# Patient Record
Sex: FEMALE | Race: WHITE | Employment: OTHER | URBAN - NONMETROPOLITAN AREA
[De-identification: names, ages, dates, MRNs, and addresses within clinical notes are randomized per-mention and may not be internally consistent; named-entity substitution may affect disease eponyms.]

---

## 2022-09-23 ENCOUNTER — HOSPITAL ENCOUNTER (EMERGENCY)
Age: 87
Discharge: HOME | End: 2022-09-23
Attending: EMERGENCY MEDICINE
Payer: MEDICARE

## 2022-09-23 ENCOUNTER — APPOINTMENT (OUTPATIENT)
Dept: GENERAL RADIOLOGY | Age: 87
End: 2022-09-23
Payer: MEDICARE

## 2022-09-23 VITALS
DIASTOLIC BLOOD PRESSURE: 86 MMHG | TEMPERATURE: 98.4 F | SYSTOLIC BLOOD PRESSURE: 156 MMHG | OXYGEN SATURATION: 96 % | RESPIRATION RATE: 18 BRPM | HEART RATE: 67 BPM | WEIGHT: 100 LBS

## 2022-09-23 DIAGNOSIS — R55 NEAR SYNCOPE: Primary | ICD-10-CM

## 2022-09-23 LAB
ALBUMIN SERPL-MCNC: 3.8 G/DL (ref 3.5–5.2)
ALP BLD-CCNC: 74 U/L (ref 35–104)
ALT SERPL-CCNC: 36 U/L (ref 5–33)
ANION GAP SERPL CALCULATED.3IONS-SCNC: 12 MMOL/L (ref 7–19)
AST SERPL-CCNC: 47 U/L (ref 5–32)
BASOPHILS ABSOLUTE: 0 K/UL (ref 0–0.2)
BASOPHILS RELATIVE PERCENT: 0.7 % (ref 0–1)
BILIRUB SERPL-MCNC: 0.6 MG/DL (ref 0.2–1.2)
BILIRUBIN URINE: NEGATIVE
BLOOD, URINE: NEGATIVE
BUN BLDV-MCNC: 22 MG/DL (ref 8–23)
CALCIUM SERPL-MCNC: 9.1 MG/DL (ref 8.8–10.2)
CHLORIDE BLD-SCNC: 105 MMOL/L (ref 98–111)
CLARITY: CLEAR
CO2: 26 MMOL/L (ref 22–29)
COLOR: YELLOW
CREAT SERPL-MCNC: 0.6 MG/DL (ref 0.5–0.9)
EOSINOPHILS ABSOLUTE: 0.1 K/UL (ref 0–0.6)
EOSINOPHILS RELATIVE PERCENT: 2.4 % (ref 0–5)
GFR AFRICAN AMERICAN: >59
GFR NON-AFRICAN AMERICAN: >60
GLUCOSE BLD-MCNC: 94 MG/DL
GLUCOSE BLD-MCNC: 94 MG/DL (ref 74–109)
GLUCOSE URINE: NEGATIVE MG/DL
HCT VFR BLD CALC: 42.5 % (ref 37–47)
HEMOGLOBIN: 13.3 G/DL (ref 12–16)
IMMATURE GRANULOCYTES #: 0 K/UL
KETONES, URINE: NEGATIVE MG/DL
LEUKOCYTE ESTERASE, URINE: NEGATIVE
LYMPHOCYTES ABSOLUTE: 1.1 K/UL (ref 1.1–4.5)
LYMPHOCYTES RELATIVE PERCENT: 24.5 % (ref 20–40)
MCH RBC QN AUTO: 29.9 PG (ref 27–31)
MCHC RBC AUTO-ENTMCNC: 31.3 G/DL (ref 33–37)
MCV RBC AUTO: 95.5 FL (ref 81–99)
MONOCYTES ABSOLUTE: 0.6 K/UL (ref 0–0.9)
MONOCYTES RELATIVE PERCENT: 14.3 % (ref 0–10)
NEUTROPHILS ABSOLUTE: 2.6 K/UL (ref 1.5–7.5)
NEUTROPHILS RELATIVE PERCENT: 57.9 % (ref 50–65)
NITRITE, URINE: NEGATIVE
PDW BLD-RTO: 13.2 % (ref 11.5–14.5)
PH UA: 7.5 (ref 5–8)
PLATELET # BLD: 156 K/UL (ref 130–400)
PMV BLD AUTO: 10.4 FL (ref 9.4–12.3)
POTASSIUM SERPL-SCNC: 3.9 MMOL/L (ref 3.5–5)
PROTEIN UA: NEGATIVE MG/DL
RBC # BLD: 4.45 M/UL (ref 4.2–5.4)
SARS-COV-2, NAAT: NOT DETECTED
SODIUM BLD-SCNC: 143 MMOL/L (ref 136–145)
SPECIFIC GRAVITY UA: 1.01 (ref 1–1.03)
TOTAL PROTEIN: 6.2 G/DL (ref 6.6–8.7)
TROPONIN: <0.01 NG/ML (ref 0–0.03)
UROBILINOGEN, URINE: 0.2 E.U./DL
WBC # BLD: 4.5 K/UL (ref 4.8–10.8)

## 2022-09-23 PROCEDURE — 93005 ELECTROCARDIOGRAM TRACING: CPT | Performed by: EMERGENCY MEDICINE

## 2022-09-23 PROCEDURE — 2580000003 HC RX 258: Performed by: EMERGENCY MEDICINE

## 2022-09-23 PROCEDURE — 71045 X-RAY EXAM CHEST 1 VIEW: CPT

## 2022-09-23 PROCEDURE — 71045 X-RAY EXAM CHEST 1 VIEW: CPT | Performed by: RADIOLOGY

## 2022-09-23 PROCEDURE — 36415 COLL VENOUS BLD VENIPUNCTURE: CPT

## 2022-09-23 PROCEDURE — 81003 URINALYSIS AUTO W/O SCOPE: CPT

## 2022-09-23 PROCEDURE — 99285 EMERGENCY DEPT VISIT HI MDM: CPT

## 2022-09-23 PROCEDURE — 85025 COMPLETE CBC W/AUTO DIFF WBC: CPT

## 2022-09-23 PROCEDURE — 84484 ASSAY OF TROPONIN QUANT: CPT

## 2022-09-23 PROCEDURE — 87635 SARS-COV-2 COVID-19 AMP PRB: CPT

## 2022-09-23 PROCEDURE — 80053 COMPREHEN METABOLIC PANEL: CPT

## 2022-09-23 RX ORDER — SODIUM CHLORIDE, SODIUM LACTATE, POTASSIUM CHLORIDE, AND CALCIUM CHLORIDE .6; .31; .03; .02 G/100ML; G/100ML; G/100ML; G/100ML
500 INJECTION, SOLUTION INTRAVENOUS ONCE
Status: COMPLETED | OUTPATIENT
Start: 2022-09-23 | End: 2022-09-23

## 2022-09-23 RX ADMIN — SODIUM CHLORIDE, POTASSIUM CHLORIDE, SODIUM LACTATE AND CALCIUM CHLORIDE 500 ML: 600; 310; 30; 20 INJECTION, SOLUTION INTRAVENOUS at 21:10

## 2022-09-23 ASSESSMENT — ENCOUNTER SYMPTOMS
COUGH: 0
RHINORRHEA: 0
ABDOMINAL PAIN: 0
EYE PAIN: 0
VOICE CHANGE: 0
SHORTNESS OF BREATH: 0
EYE REDNESS: 0
DIARRHEA: 0
VOMITING: 0

## 2022-09-23 ASSESSMENT — PAIN SCALES - GENERAL: PAINLEVEL_OUTOF10: 4

## 2022-09-23 ASSESSMENT — PAIN - FUNCTIONAL ASSESSMENT: PAIN_FUNCTIONAL_ASSESSMENT: 0-10

## 2022-09-24 NOTE — ED NOTES
Pt states that she is having left sided chest pain. Pt states that pain started 30 minutes ago. Pt states that the pain is a heaviness and does not radiate.  EKG done and given to MD. Nelly Onofre RN  09/23/22 2009

## 2022-09-24 NOTE — ED NOTES
Patient placed on cardiac monitor, continuous pulse oximeter, and NIBP monitor. Monitor alarms on.          Norman Sanchez RN  09/23/22 2454

## 2022-09-26 LAB
EKG P AXIS: 59 DEGREES
EKG P AXIS: 61 DEGREES
EKG P-R INTERVAL: 154 MS
EKG P-R INTERVAL: 155 MS
EKG Q-T INTERVAL: 398 MS
EKG Q-T INTERVAL: 403 MS
EKG QRS DURATION: 90 MS
EKG QRS DURATION: 91 MS
EKG QTC CALCULATION (BAZETT): 408 MS
EKG QTC CALCULATION (BAZETT): 413 MS
EKG T AXIS: 49 DEGREES
EKG T AXIS: 59 DEGREES

## 2022-09-26 PROCEDURE — 93010 ELECTROCARDIOGRAM REPORT: CPT | Performed by: INTERNAL MEDICINE

## 2022-11-15 RX ORDER — METOPROLOL SUCCINATE 25 MG/1
25 TABLET, EXTENDED RELEASE ORAL DAILY
COMMUNITY
Start: 2015-08-14

## 2022-11-15 RX ORDER — LISINOPRIL 40 MG/1
40 TABLET ORAL DAILY
COMMUNITY

## 2022-11-15 RX ORDER — LEVOTHYROXINE SODIUM 0.05 MG/1
50 TABLET ORAL DAILY
COMMUNITY
Start: 2014-02-25

## 2022-11-21 ENCOUNTER — TELEPHONE (OUTPATIENT)
Dept: CARDIOLOGY CLINIC | Age: 87
End: 2022-11-21

## 2022-11-22 ENCOUNTER — OFFICE VISIT (OUTPATIENT)
Dept: CARDIOLOGY CLINIC | Age: 87
End: 2022-11-22
Payer: MEDICARE

## 2022-11-22 VITALS
SYSTOLIC BLOOD PRESSURE: 126 MMHG | OXYGEN SATURATION: 97 % | BODY MASS INDEX: 18.58 KG/M2 | WEIGHT: 101 LBS | HEART RATE: 44 BPM | DIASTOLIC BLOOD PRESSURE: 68 MMHG | HEIGHT: 62 IN

## 2022-11-22 DIAGNOSIS — Z95.0 PACEMAKER: ICD-10-CM

## 2022-11-22 DIAGNOSIS — R00.2 PALPITATION: Primary | ICD-10-CM

## 2022-11-22 PROCEDURE — 99205 OFFICE O/P NEW HI 60 MIN: CPT | Performed by: INTERNAL MEDICINE

## 2022-11-22 PROCEDURE — 1123F ACP DISCUSS/DSCN MKR DOCD: CPT | Performed by: INTERNAL MEDICINE

## 2022-11-22 PROCEDURE — G8427 DOCREV CUR MEDS BY ELIG CLIN: HCPCS | Performed by: INTERNAL MEDICINE

## 2022-11-22 PROCEDURE — G8419 CALC BMI OUT NRM PARAM NOF/U: HCPCS | Performed by: INTERNAL MEDICINE

## 2022-11-22 PROCEDURE — G8484 FLU IMMUNIZE NO ADMIN: HCPCS | Performed by: INTERNAL MEDICINE

## 2022-11-22 PROCEDURE — 93280 PM DEVICE PROGR EVAL DUAL: CPT | Performed by: INTERNAL MEDICINE

## 2022-11-22 PROCEDURE — 1090F PRES/ABSN URINE INCON ASSESS: CPT | Performed by: INTERNAL MEDICINE

## 2022-11-22 PROCEDURE — 4004F PT TOBACCO SCREEN RCVD TLK: CPT | Performed by: INTERNAL MEDICINE

## 2022-11-22 RX ORDER — ISOSORBIDE MONONITRATE 30 MG/1
30 TABLET, EXTENDED RELEASE ORAL DAILY
COMMUNITY
End: 2022-11-22 | Stop reason: SDUPTHER

## 2022-11-22 RX ORDER — NITROGLYCERIN 0.4 MG/1
0.4 TABLET SUBLINGUAL EVERY 5 MIN PRN
Qty: 25 TABLET | Refills: 5 | Status: SHIPPED | OUTPATIENT
Start: 2022-11-22

## 2022-11-22 RX ORDER — ISOSORBIDE MONONITRATE 30 MG/1
30 TABLET, EXTENDED RELEASE ORAL DAILY
Qty: 90 TABLET | Refills: 5 | Status: SHIPPED | OUTPATIENT
Start: 2022-11-22

## 2022-11-22 RX ORDER — MIRTAZAPINE 7.5 MG/1
7.5 TABLET, FILM COATED ORAL NIGHTLY
COMMUNITY

## 2022-11-22 RX ORDER — NITROGLYCERIN 0.4 MG/1
0.4 TABLET SUBLINGUAL EVERY 5 MIN PRN
COMMUNITY
End: 2022-11-22 | Stop reason: SDUPTHER

## 2022-11-22 NOTE — PROGRESS NOTES
Thi Vargas is a 80 y.o. female presents with atrial fibrillation, pacemaker, and chronic stable angina. Overall she has been doing well. She does have a history of neurocardiogenic syncop and had an episode of that last month. She has a long vagal history. Review of Systems   Constitutional: Negative for fever, chills, diaphoresis, activity change, appetite change, fatigue and unexpected weight change. Eyes: Negative for photophobia, pain, redness and visual disturbance. Respiratory: Negative for apnea, cough, chest tightness, shortness of breath, wheezing and stridor. Cardiovascular: Negative for chest pain, palpitations and leg swelling. Gastrointestinal: Negative for abdominal distention. Genitourinary: Negative for dysuria, urgency and frequency. Musculoskeletal: Negative for myalgias, arthralgias and gait problem. Skin: Negative for color change, pallor, rash and wound. Neurological: Negative for dizziness, tremors, speech difficulty, weakness and numbness. Hematological: Does not bruise/bleed easily. Psychiatric/Behavioral: Negative. Past Medical History:   Diagnosis Date    Atrial fibrillation [facility])     Cardiac pacemaker     Hypertension     Hypothyroidism        No past surgical history on file. No family history on file. Social History     Socioeconomic History    Marital status:       Spouse name: Not on file    Number of children: Not on file    Years of education: Not on file    Highest education level: Not on file   Occupational History    Not on file   Tobacco Use    Smoking status: Not on file    Smokeless tobacco: Not on file   Substance and Sexual Activity    Alcohol use: Not on file    Drug use: Not on file    Sexual activity: Not on file   Other Topics Concern    Not on file   Social History Narrative    Not on file     Social Determinants of Health     Financial Resource Strain: Not on file   Food Insecurity: Not on file   Transportation Needs: Not on file   Physical Activity: Not on file   Stress: Not on file   Social Connections: Not on file   Intimate Partner Violence: Not on file   Housing Stability: Not on file       Allergies   Allergen Reactions    Latex Hives    Balsam [place]-Castor Oil Hives    Hydrocodone Other (See Comments)    Morphine Other (See Comments)    Other Other (See Comments)    Sulfa Antibiotics          Current Outpatient Medications:     mirtazapine (REMERON) 7.5 MG tablet, Take 7.5 mg by mouth nightly, Disp: , Rfl:     nitroGLYCERIN (NITROSTAT) 0.4 MG SL tablet, Place 1 tablet under the tongue every 5 minutes as needed for Chest pain up to max of 3 total doses. If no relief after 1 dose, call 911., Disp: 25 tablet, Rfl: 5    isosorbide mononitrate (IMDUR) 30 MG extended release tablet, Take 1 tablet by mouth daily, Disp: 90 tablet, Rfl: 5    vitamin D (CHOLECALCIFEROL) 25 MCG (1000 UT) TABS tablet, Take 1,000 Units by mouth daily, Disp: , Rfl:     levothyroxine (SYNTHROID) 50 MCG tablet, Take 50 mcg by mouth daily, Disp: , Rfl:     metoprolol succinate (TOPROL XL) 25 MG extended release tablet, Take 25 mg by mouth daily, Disp: , Rfl:     UNABLE TO FIND, in the morning and at bedtime Vitc 250mg-mg-E 90mg-zinc 40mg-copper 1mg- lutein-zeaxan chew tablet, Disp: , Rfl:     apixaban (ELIQUIS) 2.5 MG TABS tablet, Take 2.5 mg by mouth 2 times daily, Disp: , Rfl:     lisinopril (PRINIVIL;ZESTRIL) 40 MG tablet, Take 40 mg by mouth daily, Disp: , Rfl:     PE:  Vitals:    11/22/22 1354   BP: 126/68   Pulse: (!) 44   SpO2: 97%   Weight: 101 lb (45.8 kg)   Height: 5' 2\" (1.575 m)       Estimated body mass index is 18.47 kg/m² as calculated from the following:    Height as of this encounter: 5' 2\" (1.575 m). Weight as of this encounter: 101 lb (45.8 kg). Constitutional: She is oriented to person, place, and time. She appears well-developed and well-nourished in no acute distress. Neck:  Neck supple without JVD present.  No trachea deviation present. No thyromegaly present. Eyes:Conjunctivae and EOM are normal. Pupils equal and reactive to light. ENT:Hearing appears normal.conjunctiva and lids are normal, ears and nose appear normal.  Cardiovascular: Normal rate, S1-S2 regular rhythm, normal heart sounds. 2 out of 6 holosystolic murmur ascultated. No gallop and no friction rub. No carotid bruits. No peripheral edema. Pulmonary/Chest:  Lungs clear to auscultation bilaterally without evidence of respiratory distress. She without wheezes. She without rales or ronchi. Musculoskeletal: Normal range of motion. Gait is normal no assitive device. Head is normocephalic and atraumatic. Skin: Skin is warm and dry without rash or pallor. Psychiatric:She is alert and oriented to person, place, and time. She has a normal mood and affect. Her behavior is normal. Thought content normal.       Lab Results   Component Value Date/Time    CREATININE 0.6 09/23/2022 06:30 PM    HGB 13.3 09/23/2022 06:30 PM       Assessment, Recommendations, & Plan:  80 y.o. female with atrial fibrillation, pacemaker therapy, and coronary disease. Her device was interrogated and is functioning normally. She wants to stay on Eliquis for a while but may have to go to Coumadin for cost reasons in the next year. Disposition - RTC in 6 months or sooner if needed      Please do not hesitate to contact me for any questions or concerns. Dr. Aracelis Saavedra.  [place]  Electrophysiology and Cardiology  [facility] [facility] [place], Cardiology  [phone]

## 2022-11-22 NOTE — PROGRESS NOTES
Pacemaker interrogated  Presenting rhythm:  AS/VS, AP 26%,  <1%  Battey voltage 8.3 - 11.0 years  Lead status:  Lead impedance within range and stable  Sensing:  P waves 3.1 mV,  R waves 10.4 mV  Thresholds:  Atrial 0.625V @ 0.4ms, ventricular 0.750 V@ 0.4ms  Observations:  <1% AT/AF burden  Reprogramming for sensitivity and threshold testing

## 2022-11-29 ENCOUNTER — TELEPHONE (OUTPATIENT)
Dept: CARDIOLOGY CLINIC | Age: 87
End: 2022-11-29

## 2022-11-29 ENCOUNTER — HOSPITAL ENCOUNTER (OUTPATIENT)
Dept: NON INVASIVE DIAGNOSTICS | Age: 87
Discharge: HOME | End: 2022-11-29
Payer: MEDICARE

## 2022-11-29 DIAGNOSIS — R00.2 PALPITATION: ICD-10-CM

## 2022-11-29 PROCEDURE — 93306 TTE W/DOPPLER COMPLETE: CPT

## 2022-11-30 ENCOUNTER — TELEPHONE (OUTPATIENT)
Dept: CARDIOLOGY CLINIC | Age: 87
End: 2022-11-30

## 2022-11-30 NOTE — TELEPHONE ENCOUNTER
----- Message from Willis Santoro MD sent at 11/30/2022  8:14 AM CST -----  Let her know echo results normal thx

## 2022-12-01 NOTE — TELEPHONE ENCOUNTER
TYRELLM for Pt to call our office to review results. Also, called Pt's Dayan Wolff, to call our office to review.

## 2022-12-16 ENCOUNTER — HOSPITAL ENCOUNTER (INPATIENT)
Age: 87
LOS: 10 days | Discharge: NURSING, REHAB OR LONG-TERM CARE | DRG: 535 | End: 2022-12-26
Attending: EMERGENCY MEDICINE | Admitting: STUDENT IN AN ORGANIZED HEALTH CARE EDUCATION/TRAINING PROGRAM
Payer: MEDICARE

## 2022-12-16 ENCOUNTER — APPOINTMENT (OUTPATIENT)
Dept: GENERAL RADIOLOGY | Age: 87
DRG: 535 | End: 2022-12-16
Payer: MEDICARE

## 2022-12-16 ENCOUNTER — APPOINTMENT (OUTPATIENT)
Dept: CT IMAGING | Age: 87
DRG: 535 | End: 2022-12-16
Payer: MEDICARE

## 2022-12-16 DIAGNOSIS — S32.89XA CLOSED FRACTURE OF OTHER PARTS OF PELVIS, INITIAL ENCOUNTER (HCC): Primary | ICD-10-CM

## 2022-12-16 PROBLEM — R55 SYNCOPE AND COLLAPSE: Status: ACTIVE | Noted: 2022-12-16
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
## 2022-12-16 LAB
ALBUMIN SERPL-MCNC: 3.9 G/DL (ref 3.5–5.2)
ALLENS TEST: ABNORMAL
ALP BLD-CCNC: 75 U/L (ref 35–104)
ALT SERPL-CCNC: 28 U/L (ref 5–33)
ANION GAP SERPL CALCULATED.3IONS-SCNC: 9 MMOL/L (ref 7–19)
APTT: 36.6 SEC (ref 26–36.2)
AST SERPL-CCNC: 36 U/L (ref 5–32)
BASE EXCESS ARTERIAL: 6.5 MMOL/L (ref -2–2)
BASOPHILS ABSOLUTE: 0.1 K/UL (ref 0–0.2)
BASOPHILS RELATIVE PERCENT: 0.4 % (ref 0–1)
BILIRUB SERPL-MCNC: 0.9 MG/DL (ref 0.2–1.2)
BUN BLDV-MCNC: 21 MG/DL (ref 8–23)
CALCIUM SERPL-MCNC: 9.4 MG/DL (ref 8.8–10.2)
CARBOXYHEMOGLOBIN ARTERIAL: 2.1 % (ref 0–5)
CHLORIDE BLD-SCNC: 105 MMOL/L (ref 98–111)
CO2: 31 MMOL/L (ref 22–29)
CREAT SERPL-MCNC: 0.8 MG/DL (ref 0.5–0.9)
EOSINOPHILS ABSOLUTE: 0 K/UL (ref 0–0.6)
EOSINOPHILS RELATIVE PERCENT: 0.2 % (ref 0–5)
GFR SERPL CREATININE-BSD FRML MDRD: >60 ML/MIN/{1.73_M2}
GLUCOSE BLD-MCNC: 118 MG/DL (ref 74–109)
GLUCOSE BLD-MCNC: 84 MG/DL (ref 70–99)
HCO3 ARTERIAL: 31.9 MMOL/L (ref 22–26)
HCT VFR BLD CALC: 44.5 % (ref 37–47)
HEMOGLOBIN, ART, EXTENDED: 12.4 G/DL (ref 12–16)
HEMOGLOBIN: 14 G/DL (ref 12–16)
IMMATURE GRANULOCYTES #: 0.1 K/UL
INR BLD: 1.23 (ref 0.88–1.18)
LYMPHOCYTES ABSOLUTE: 0.9 K/UL (ref 1.1–4.5)
LYMPHOCYTES RELATIVE PERCENT: 7 % (ref 20–40)
MCH RBC QN AUTO: 30.7 PG (ref 27–31)
MCHC RBC AUTO-ENTMCNC: 31.5 G/DL (ref 33–37)
MCV RBC AUTO: 97.6 FL (ref 81–99)
METHEMOGLOBIN ARTERIAL: 1.3 %
MONOCYTES ABSOLUTE: 0.6 K/UL (ref 0–0.9)
MONOCYTES RELATIVE PERCENT: 4.9 % (ref 0–10)
NEUTROPHILS ABSOLUTE: 10.4 K/UL (ref 1.5–7.5)
NEUTROPHILS RELATIVE PERCENT: 86.3 % (ref 50–65)
O2 CONTENT ARTERIAL: 16.8 ML/DL
O2 DELIVERY DEVICE: ABNORMAL
O2 SAT, ARTERIAL: 95.6 %
O2 THERAPY: ABNORMAL
OXYGEN FLOW: 3
PCO2 ARTERIAL: 48 MMHG (ref 35–45)
PDW BLD-RTO: 13.4 % (ref 11.5–14.5)
PERFORMED ON: NORMAL
PH ARTERIAL: 7.43 (ref 7.35–7.45)
PLATELET # BLD: 142 K/UL (ref 130–400)
PMV BLD AUTO: 10.2 FL (ref 9.4–12.3)
PO2 ARTERIAL: 95 MMHG (ref 80–100)
POTASSIUM SERPL-SCNC: 3.7 MMOL/L (ref 3.5–5)
POTASSIUM, WHOLE BLOOD: 3.7
PROTHROMBIN TIME: 15.5 SEC (ref 12–14.6)
RBC # BLD: 4.56 M/UL (ref 4.2–5.4)
REASON FOR REJECTION: NORMAL
REJECTED TEST: NORMAL
SARS-COV-2, NAAT: NOT DETECTED
SODIUM BLD-SCNC: 145 MMOL/L (ref 136–145)
TOTAL PROTEIN: 6.8 G/DL (ref 6.6–8.7)
TSH SERPL DL<=0.05 MIU/L-ACNC: 3.42 UIU/ML (ref 0.27–4.2)
WBC # BLD: 12.1 K/UL (ref 4.8–10.8)

## 2022-12-16 PROCEDURE — 70450 CT HEAD/BRAIN W/O DYE: CPT | Performed by: RADIOLOGY

## 2022-12-16 PROCEDURE — 6360000002 HC RX W HCPCS: Performed by: EMERGENCY MEDICINE

## 2022-12-16 PROCEDURE — 36415 COLL VENOUS BLD VENIPUNCTURE: CPT

## 2022-12-16 PROCEDURE — 82607 VITAMIN B-12: CPT

## 2022-12-16 PROCEDURE — 85730 THROMBOPLASTIN TIME PARTIAL: CPT

## 2022-12-16 PROCEDURE — 82746 ASSAY OF FOLIC ACID SERUM: CPT

## 2022-12-16 PROCEDURE — 99285 EMERGENCY DEPT VISIT HI MDM: CPT

## 2022-12-16 PROCEDURE — 82306 VITAMIN D 25 HYDROXY: CPT

## 2022-12-16 PROCEDURE — 2700000000 HC OXYGEN THERAPY PER DAY

## 2022-12-16 PROCEDURE — 72131 CT LUMBAR SPINE W/O DYE: CPT | Performed by: RADIOLOGY

## 2022-12-16 PROCEDURE — 2580000003 HC RX 258

## 2022-12-16 PROCEDURE — 73502 X-RAY EXAM HIP UNI 2-3 VIEWS: CPT

## 2022-12-16 PROCEDURE — 96375 TX/PRO/DX INJ NEW DRUG ADDON: CPT

## 2022-12-16 PROCEDURE — 96376 TX/PRO/DX INJ SAME DRUG ADON: CPT

## 2022-12-16 PROCEDURE — 2580000003 HC RX 258: Performed by: EMERGENCY MEDICINE

## 2022-12-16 PROCEDURE — 1210000000 HC MED SURG R&B

## 2022-12-16 PROCEDURE — 93005 ELECTROCARDIOGRAM TRACING: CPT | Performed by: EMERGENCY MEDICINE

## 2022-12-16 PROCEDURE — 80053 COMPREHEN METABOLIC PANEL: CPT

## 2022-12-16 PROCEDURE — 82803 BLOOD GASES ANY COMBINATION: CPT

## 2022-12-16 PROCEDURE — 72131 CT LUMBAR SPINE W/O DYE: CPT

## 2022-12-16 PROCEDURE — 6360000002 HC RX W HCPCS

## 2022-12-16 PROCEDURE — 87635 SARS-COV-2 COVID-19 AMP PRB: CPT

## 2022-12-16 PROCEDURE — 85025 COMPLETE CBC W/AUTO DIFF WBC: CPT

## 2022-12-16 PROCEDURE — 36600 WITHDRAWAL OF ARTERIAL BLOOD: CPT

## 2022-12-16 PROCEDURE — 94761 N-INVAS EAR/PLS OXIMETRY MLT: CPT

## 2022-12-16 PROCEDURE — 82947 ASSAY GLUCOSE BLOOD QUANT: CPT

## 2022-12-16 PROCEDURE — 72192 CT PELVIS W/O DYE: CPT | Performed by: RADIOLOGY

## 2022-12-16 PROCEDURE — 84443 ASSAY THYROID STIM HORMONE: CPT

## 2022-12-16 PROCEDURE — 72192 CT PELVIS W/O DYE: CPT

## 2022-12-16 PROCEDURE — 71045 X-RAY EXAM CHEST 1 VIEW: CPT

## 2022-12-16 PROCEDURE — 96374 THER/PROPH/DIAG INJ IV PUSH: CPT

## 2022-12-16 PROCEDURE — 85610 PROTHROMBIN TIME: CPT

## 2022-12-16 PROCEDURE — 70450 CT HEAD/BRAIN W/O DYE: CPT

## 2022-12-16 RX ORDER — SODIUM CHLORIDE 0.9 % (FLUSH) 0.9 %
5-40 SYRINGE (ML) INJECTION EVERY 12 HOURS SCHEDULED
Status: DISCONTINUED | OUTPATIENT
Start: 2022-12-16 | End: 2022-12-26 | Stop reason: HOSPADM

## 2022-12-16 RX ORDER — ISOSORBIDE MONONITRATE 30 MG/1
30 TABLET, EXTENDED RELEASE ORAL DAILY
Status: DISCONTINUED | OUTPATIENT
Start: 2022-12-16 | End: 2022-12-26 | Stop reason: HOSPADM

## 2022-12-16 RX ORDER — HYDROMORPHONE HYDROCHLORIDE 1 MG/ML
0.5 INJECTION, SOLUTION INTRAMUSCULAR; INTRAVENOUS; SUBCUTANEOUS EVERY 30 MIN PRN
Status: DISCONTINUED | OUTPATIENT
Start: 2022-12-16 | End: 2022-12-16

## 2022-12-16 RX ORDER — NALOXONE HYDROCHLORIDE 0.4 MG/ML
0.2 INJECTION, SOLUTION INTRAMUSCULAR; INTRAVENOUS; SUBCUTANEOUS ONCE
Status: COMPLETED | OUTPATIENT
Start: 2022-12-16 | End: 2022-12-16

## 2022-12-16 RX ORDER — LISINOPRIL 20 MG/1
40 TABLET ORAL DAILY
Status: DISCONTINUED | OUTPATIENT
Start: 2022-12-16 | End: 2022-12-26 | Stop reason: HOSPADM

## 2022-12-16 RX ORDER — FENTANYL CITRATE 50 UG/ML
50 INJECTION, SOLUTION INTRAMUSCULAR; INTRAVENOUS ONCE
Status: COMPLETED | OUTPATIENT
Start: 2022-12-16 | End: 2022-12-16

## 2022-12-16 RX ORDER — NALOXONE HYDROCHLORIDE 0.4 MG/ML
INJECTION, SOLUTION INTRAMUSCULAR; INTRAVENOUS; SUBCUTANEOUS
Status: DISPENSED
Start: 2022-12-16 | End: 2022-12-17

## 2022-12-16 RX ORDER — ONDANSETRON 2 MG/ML
4 INJECTION INTRAMUSCULAR; INTRAVENOUS EVERY 6 HOURS PRN
Status: DISCONTINUED | OUTPATIENT
Start: 2022-12-16 | End: 2022-12-26 | Stop reason: HOSPADM

## 2022-12-16 RX ORDER — SODIUM CHLORIDE 0.9 % (FLUSH) 0.9 %
5-40 SYRINGE (ML) INJECTION PRN
Status: DISCONTINUED | OUTPATIENT
Start: 2022-12-16 | End: 2022-12-26 | Stop reason: HOSPADM

## 2022-12-16 RX ORDER — ACETAMINOPHEN 650 MG/1
650 SUPPOSITORY RECTAL EVERY 6 HOURS PRN
Status: DISCONTINUED | OUTPATIENT
Start: 2022-12-16 | End: 2022-12-26 | Stop reason: HOSPADM

## 2022-12-16 RX ORDER — LORAZEPAM 2 MG/ML
1 INJECTION INTRAMUSCULAR ONCE
Status: COMPLETED | OUTPATIENT
Start: 2022-12-16 | End: 2022-12-16

## 2022-12-16 RX ORDER — METOPROLOL SUCCINATE 25 MG/1
25 TABLET, EXTENDED RELEASE ORAL DAILY
Status: DISCONTINUED | OUTPATIENT
Start: 2022-12-16 | End: 2022-12-22

## 2022-12-16 RX ORDER — VITAMIN B COMPLEX
1000 TABLET ORAL DAILY
Status: DISCONTINUED | OUTPATIENT
Start: 2022-12-16 | End: 2022-12-26 | Stop reason: HOSPADM

## 2022-12-16 RX ORDER — ONDANSETRON 2 MG/ML
4 INJECTION INTRAMUSCULAR; INTRAVENOUS ONCE
Status: COMPLETED | OUTPATIENT
Start: 2022-12-16 | End: 2022-12-16

## 2022-12-16 RX ORDER — LEVOTHYROXINE SODIUM 0.05 MG/1
50 TABLET ORAL DAILY
Status: DISCONTINUED | OUTPATIENT
Start: 2022-12-16 | End: 2022-12-26 | Stop reason: HOSPADM

## 2022-12-16 RX ORDER — POLYETHYLENE GLYCOL 3350 17 G/17G
17 POWDER, FOR SOLUTION ORAL DAILY PRN
Status: DISCONTINUED | OUTPATIENT
Start: 2022-12-16 | End: 2022-12-26 | Stop reason: HOSPADM

## 2022-12-16 RX ORDER — SODIUM CHLORIDE 9 MG/ML
INJECTION, SOLUTION INTRAVENOUS PRN
Status: DISCONTINUED | OUTPATIENT
Start: 2022-12-16 | End: 2022-12-26 | Stop reason: HOSPADM

## 2022-12-16 RX ORDER — ACETAMINOPHEN 325 MG/1
650 TABLET ORAL EVERY 6 HOURS PRN
Status: DISCONTINUED | OUTPATIENT
Start: 2022-12-16 | End: 2022-12-26 | Stop reason: HOSPADM

## 2022-12-16 RX ORDER — ONDANSETRON 4 MG/1
4 TABLET, ORALLY DISINTEGRATING ORAL EVERY 8 HOURS PRN
Status: DISCONTINUED | OUTPATIENT
Start: 2022-12-16 | End: 2022-12-26 | Stop reason: HOSPADM

## 2022-12-16 RX ORDER — SODIUM CHLORIDE 9 MG/ML
INJECTION, SOLUTION INTRAVENOUS CONTINUOUS
Status: DISCONTINUED | OUTPATIENT
Start: 2022-12-16 | End: 2022-12-22

## 2022-12-16 RX ADMIN — ONDANSETRON 4 MG: 2 INJECTION INTRAMUSCULAR; INTRAVENOUS at 13:27

## 2022-12-16 RX ADMIN — HYDROMORPHONE HYDROCHLORIDE 0.5 MG: 1 INJECTION, SOLUTION INTRAMUSCULAR; INTRAVENOUS; SUBCUTANEOUS at 15:10

## 2022-12-16 RX ADMIN — HYDROMORPHONE HYDROCHLORIDE 0.5 MG: 1 INJECTION, SOLUTION INTRAMUSCULAR; INTRAVENOUS; SUBCUTANEOUS at 13:28

## 2022-12-16 RX ADMIN — NALOXONE HYDROCHLORIDE 0.2 MG: 0.4 INJECTION, SOLUTION INTRAMUSCULAR; INTRAVENOUS; SUBCUTANEOUS at 17:24

## 2022-12-16 RX ADMIN — LORAZEPAM 1 MG: 2 INJECTION INTRAMUSCULAR; INTRAVENOUS at 16:48

## 2022-12-16 RX ADMIN — FENTANYL CITRATE 50 MCG: 50 INJECTION INTRAMUSCULAR; INTRAVENOUS at 16:49

## 2022-12-16 RX ADMIN — SODIUM CHLORIDE: 9 INJECTION, SOLUTION INTRAVENOUS at 17:12

## 2022-12-16 RX ADMIN — SODIUM CHLORIDE: 9 INJECTION, SOLUTION INTRAVENOUS at 15:16

## 2022-12-16 RX ADMIN — SODIUM CHLORIDE, PRESERVATIVE FREE 10 ML: 5 INJECTION INTRAVENOUS at 20:51

## 2022-12-16 ASSESSMENT — ENCOUNTER SYMPTOMS
APNEA: 0
CONSTIPATION: 0
FACIAL SWELLING: 0
SHORTNESS OF BREATH: 0
NAUSEA: 0
SHORTNESS OF BREATH: 1
EYE DISCHARGE: 0
VOICE CHANGE: 0
GASTROINTESTINAL NEGATIVE: 1
SORE THROAT: 0
SINUS PRESSURE: 0
CHOKING: 0
ABDOMINAL PAIN: 0
BLOOD IN STOOL: 0
DIARRHEA: 0

## 2022-12-16 ASSESSMENT — PAIN SCALES - GENERAL
PAINLEVEL_OUTOF10: 7
PAINLEVEL_OUTOF10: 8

## 2022-12-16 NOTE — H&P
[facility] - History & Physical      PCP: Marcelo Patton    Date of Admission: 12/16/2022    Date of Service: 12/16/2022    Chief Complaint: Fall    History Of Present Illness: The patient is a 80 y.o. female who presented to [facility] ER with PMH HTN, hypothyroidism, AF, complaining of fall. Patient states that she became dizzy, lightheaded and did lose consciousness today landing on her right hip and did hit her head. She denies loss of bowel or bladder, visual change, and does endorse mild headache. She states she was unable to ambulate after fall and EMS was notified. Denies systemic symptoms, fever, chills, nausea, vomiting, abdominal pain, shortness of breath, chest pain. Patient received IV fentanyl and IV Ativan due to increased pain while in ER. Upon arrival to floor rapid response was called due to oxygen saturation in the 60s. Can Hull NP and Dr. Alexia Casarez present and 0.2 mg Narcan given, patient arousable to speech, and answering questions appropriately. Continuous pulse oximetry and monitor on telemetry on medical floor at this time. Work-up in ER CXR no acute cardiopulmonary process, CT lumbar spine no acute fracture or subluxation, CT head no acute intracranial abnormality, CT pelvis multiple fractures of right sacrum, right inferior pubic rami and left pubic symphysis, no subluxation or dislocation, soft tissue swelling/blood products to the angulated right inferior pubic rami fracture, right hip prosthetic appears intact. patient is to be admitted to hospitalist service with consultation orthopedic surgery due to pelvic fracture.   Past Medical History:        Diagnosis Date    Atrial fibrillation [facility])     Cardiac pacemaker     Hypertension     Hypothyroidism        Past Surgical History:        Procedure Laterality Date    APPENDECTOMY      CHOLECYSTECTOMY      EYE SURGERY      HYSTERECTOMY (CERVIX STATUS UNKNOWN)      KNEE SURGERY Right     TONSILLECTOMY Home Medications:  Prior to Admission medications    Medication Sig Start Date End Date Taking? Authorizing Provider   mirtazapine (REMERON) 7.5 MG tablet Take 7.5 mg by mouth nightly    Historical Provider, MD   nitroGLYCERIN (NITROSTAT) 0.4 MG SL tablet Place 1 tablet under the tongue every 5 minutes as needed for Chest pain up to max of 3 total doses. If no relief after 1 dose, call 911. 11/22/22   Willis Santoro MD   isosorbide mononitrate (IMDUR) 30 MG extended release tablet Take 1 tablet by mouth daily 11/22/22   Willis Santoro MD   vitamin D (CHOLECALCIFEROL) 25 MCG (1000 UT) TABS tablet Take 1,000 Units by mouth daily    Historical Provider, MD   levothyroxine (SYNTHROID) 50 MCG tablet Take 50 mcg by mouth daily 2/25/14   Historical Provider, MD   metoprolol succinate (TOPROL XL) 25 MG extended release tablet Take 25 mg by mouth daily 8/14/15   Historical Provider, MD   lisinopril (PRINIVIL;ZESTRIL) 40 MG tablet Take 40 mg by mouth daily    Historical Provider, MD Gerard Carrasco in the morning and at bedtime Vitc 250mg-mg-E 90mg-zinc 40mg-copper 1mg- lutein-zeaxan chew tablet    Historical Provider, MD   apixaban (ELIQUIS) 2.5 MG TABS tablet Take 2.5 mg by mouth 2 times daily    Historical Provider, MD       Allergies:    Latex, Balsam peru-castor oil, Hydrocodone, Morphine, Other, and Sulfa antibiotics    Social History:    The patient currently lives home  Tobacco:   reports that she has never smoked. She has never used smokeless tobacco.  Alcohol:   reports that she does not currently use alcohol. Illicit Drugs: denies    Family History:  History reviewed. No pertinent family history. Review of Systems:   Review of Systems   Constitutional:  Positive for activity change and fatigue. HENT: Negative. Respiratory:  Positive for shortness of breath. Cardiovascular: Negative. Gastrointestinal: Negative. Genitourinary: Negative. Musculoskeletal: Negative.     Skin: Negative. Neurological:  Positive for dizziness, syncope, weakness and light-headedness. 14 point review of systems is negative except as specifically addressed above. Physical Examination:  BP (!) 153/87   Pulse 90   Temp 98.1 °F (36.7 °C) (Oral)   Resp 20   Ht 5' 2\" (1.575 m)   Wt 101 lb (45.8 kg)   SpO2 95%   BMI 18.47 kg/m²   Physical Exam  Vitals and nursing note reviewed. Constitutional:       General: She is not in acute distress. Appearance: She is ill-appearing. HENT:      Mouth/Throat:      Mouth: Mucous membranes are dry. Pharynx: Oropharynx is clear. Eyes:      Extraocular Movements: Extraocular movements intact. Conjunctiva/sclera: Conjunctivae normal.      Pupils: Pupils are equal, round, and reactive to light. Cardiovascular:      Rate and Rhythm: Normal rate and regular rhythm. Pulses: Normal pulses. Heart sounds: Normal heart sounds. No murmur heard. Pulmonary:      Effort: Pulmonary effort is normal. No respiratory distress. Breath sounds: Normal breath sounds. Chest:      Chest wall: No tenderness. Abdominal:      General: Bowel sounds are normal. There is no distension. Palpations: Abdomen is soft. Tenderness: There is no abdominal tenderness. There is no guarding or rebound. Musculoskeletal:         General: No swelling. Normal range of motion. Cervical back: Normal range of motion and neck supple. No rigidity or tenderness. Right lower leg: No edema. Left lower leg: No edema. Comments: TTP right hip   Skin:     General: Skin is warm and dry. Capillary Refill: Capillary refill takes less than 2 seconds. Coloration: Skin is pale. Neurological:      General: No focal deficit present. Mental Status: She is alert. GCS: GCS eye subscore is 3. GCS verbal subscore is 5. GCS motor subscore is 6. Cranial Nerves: No cranial nerve deficit, dysarthria or facial asymmetry.       Motor: Weakness present. Comments: Neurovascular intact   Psychiatric:         Mood and Affect: Mood normal.         Behavior: Behavior normal.        Diagnostic Data:  CBC:  Recent Labs     12/16/22  1333   WBC 12.1*   HGB 14.0   HCT 44.5        BMP:  Recent Labs     12/16/22  1333      K 3.7      CO2 31*   BUN 21   CREATININE 0.8   CALCIUM 9.4     Recent Labs     12/16/22  1333   AST 36*   ALT 28   BILITOT 0.9   ALKPHOS 75     Coag Panel:   Recent Labs     12/16/22  1333   INR 1.23*   PROTIME 15.5*   APTT 36.6*       Urinalysis:  Lab Results   Component Value Date/Time    NITRU Negative 09/23/2022 09:27 PM    BLOODU Negative 09/23/2022 09:27 PM    SPECGRAV 1.015 09/23/2022 09:27 PM    GLUCOSEU Negative 09/23/2022 09:27 PM       XR CHEST PORTABLE    Result Date: 12/16/2022  CLINICAL HISTORY: Fall, right hip injury TECHNIQUE: Single AP view of the chest COMPARISON: CXR from 9/23/2022 FINDINGS: Lines and tubes: Left chest pacemaker. Cardiomediastinal: Normal size and configuration of the cardiomediastinal silhouette. No pneumomediastinum. Calcific atherosclerosis of the aortic arch. Lungs and pleura: The lungs are grossly unremarkable. No pleural effusion. No pneumothorax. Musculoskeletal: No acute osseous abnormalities. Osseous degenerative changes. Other: None. No acute cardiopulmonary process identified. XR HIP 2-3 VW W PELVIS RIGHT    Result Date: 12/16/2022  CLINICAL HISTORY: Fall, right hip pain TECHNIQUE: 4 views of the pelvis and right hip COMPARISON: None    The bones are diffusely demineralized. Question nondisplaced right inferior pubic ramus fracture and minimally displaced left superior pubic ramus fracture. Intramedullary sudhir is seen in the right femur. The hardware appears intact. A CT showed be performed for further characterization. Vascular calcifications.       Assessment/Plan:  Principal Problem:    Closed fracture of other parts of pelvis, initial encounter   -Consultation to orthopedic surgery   -XR hip pelvis    -CT pelvis    -Vital signs stable is currently HTN, monitor H/H   -Soft, non-tender abdomen    -As needed pain medication   Active Problems:    Fall/ Syncope and collapse    -Ct head    -CT lumbar spine    -Urinalysis & Culture    - ECHO 11/29/22: Trace MR, mild AR, severe TR, preserved LVEF 91%, grade 1 diastolic dysfunction              - Neuro checks              - Fall precaution              - Bed alarm on              - Monitor on telemetry   -TSH   -Bedrest overnight and re-evaluate in AM    Continuous pulse oximetry, telemetry monitoring, and limit sedatives at this time we will continue to monitor on floor  Updated nursing staff    DVT prophylactic: SCD    Signed:  YOVANA Roach CNP, 12/16/2022 3:20 PM       EMR Dragon/Transcription disclaimer:   Much of this encounter note is an electronic transcription/translation of spoken language to printed text.  The electronic translation of spoken language may permit erroneous, or at times, nonsensical words or phrases to be inadvertently transcribed; although attempts have made to review the note for such errors, some may still exist.

## 2022-12-16 NOTE — LETTER
Fidel Malone,  at [facility]  [phone] phone  [phone] fax  [phone] CELL (CARL[address])        Fidel Malone  at [facility]  [phone] phone  [phone] fax  [phone] CELL (CARL[address])

## 2022-12-16 NOTE — CARE COORDINATION
Patient Contact Information:     [address]  [place] [phone] (home)   Telephone Information:   Mobile [phone]     Above information verified? [x]   Yes  []   No      Emergency Contacts:    Extended Emergency Contact Information  Primary Emergency Contact: Daisha Hernandez  Home Phone: [phone]  Relation: Brother/Sister  Secondary Emergency Contact: [address] Phone: [phone]  Mobile Phone: [phone]  Relation: Grandchild      Have you been vaccinated for COVID-19 (SARS-CoV-2)? [x]   Yes  []   No                   If so, when? Which :         [x]   Pfizer-BioNTech  []   Moderna  []   Justus Broach  []   Other:         Pharmacy:    [facility] #[id number] - [facility], [address] [place] [address]  [address] [facility] [phone]  [address] [place] [address]  Phone: [phone] Fax: [phone]    Chacho Medrano / Tiffanie Atkinson, [address] [facility] [id number] [place] [address] 1/2 [address] [facility] [phone] - F [phone]  [facility] 1/2 [address] [facility].   [place] 6 [address]  Phone: [phone] Fax: [phone]          Patient Deficits:    []   Yes   []   No    If yes:    []   Confusion/Memory  []   Visual  []   Motor/Sensory         []   Right arm         []   Right leg         []   Left arm         []   Left leg  []   Language/Speech         []   Aphasia         []   Dysarthria         []   Swallow         Ramez Coma Scale  Eye Opening: Spontaneous  Best Verbal Response: Oriented  Best Motor Response: Obeys commands  Ramez Coma Scale Score: 15    Patient Deficit Notes:          12/16/22 0877   Service Assessment   Patient Orientation Unable to Assess  (Pt was given dilodin)   Cognition Other (see comment)  (Unable to access pt given Dilodin)   History Provided By Child/Family  (Sister)   Primary Caregiver Self   Accompanied By/Relationship Sister   Support Systems Family Members;[employer] [employer]   Patient's Healthcare Decision Maker is: Named in [address] [place] [address]   PCP Verified by NU Yes   Prior Functional Level Independent in ADLs/IADLs;Mobility   Current Functional Level Assistance with the following:;Mobility;Housework; Bathing;Dressing; Shopping;Cooking   Can patient return to prior living arrangement Yes   Ability to make needs known: Good   Family able to assist with home care needs: Yes   Would you like for me to discuss the discharge plan with any other family members/significant others, and if so, who?   (Pt unable to respond)   Financial Resources Medicare   Community Resources None   CM/SW Referral DME   Social/Functional History   Lives With Alone   Type of Home Apartment   Home Layout One level   Home Access Level entry   Bathroom Shower/Tub Walk-in shower   Bathroom Toilet Standard   Bathroom Equipment Grab bars in shower;Built-in shower seat   [address] bars; Anne Merritt, standard;Rollator   Receives Help From Family   ADL Assistance Independent   Homemaking Assistance Independent   Homemaking Responsibilities Yes   Ambulation Assistance Independent   Transfer Assistance Independent   Active  No   Patient's  Info Family   Mode of Transportation Car   Occupation Retired   Discharge Planning   Type of [address] Prior To Admission None   Potential Assistance Needed Durable Medical Equipment   Potential DME Needed Bedside Commode   DME Ordered? No   Potential Assistance Purchasing Medications No   Type of Home Care Services OT;PT   Patient expects to be discharged to: House   One/Two Story Residence One story   Services At/After Discharge   Transition of Care Consult (CM Consult) SNF; Home Health;DME/Supply [address] Discharge DME; Home Health;OT;PT;Skilled Nursing Facility (SNF)   Mode of Transport at Discharge Other (see comment)  (Family)   Confirm Follow Up Transport Family

## 2022-12-16 NOTE — PROGRESS NOTES
Rapid Response was called @1709. Patient was lethargic and unable to follow commands. Prior to writer arrival patient was placed on nonrebreather on 15 L. Patient O2 was reading 60% with good waveform prior to this. 0.9 NS @100 Ml/hr started @1711. BS obtained @1714; was noted to be 84. Narcan 0.2 mg via IV was ordered and administered @1715. Given by Tatyana Atkinson. . SpO2 @1715 was 99% on Nonrebreather @15L. Continuous pulse ox order and placed on patient @1717. Patient was able to answer orientation questions and increased in LOC. See flowsheets regarding vital signs and order sets.

## 2022-12-16 NOTE — ED PROVIDER NOTES
[facility] EMERGENCY DEPT  eMERGENCY dEPARTMENT eNCOUnter      Pt Name: Zara Patel  MRN: [id number]  [place] 1/8/1932  Date of evaluation: 12/16/2022  Provider: Tim Koehler MD    [address]       Chief Complaint   Patient presents with    Fall     Ground level    Hip Injury     Right hip, shortening and rotation noted         HISTORY OF PRESENT ILLNESS   (Location/Symptom, Timing/Onset,Context/Setting, Quality, Duration, Modifying Factors, Severity)  Note limiting factors. Zara Patel is a 80 y.o. female who presents to the emergency department fall with right hip pain    80-year-old female does not know why she fell. Sister thinks she got up too fast.  She fell on carpet and is complaining of right hip pain. States she hits her head but no complaints with her head or neck or other extremities but having pain in the hip. Brought in on longboard by EMS. No recent infectious symptoms. The history is provided by the patient. NursingNotes were reviewed. REVIEW OF SYSTEMS    (2-9 systems for level 4, 10 or more for level 5)     Review of Systems   Constitutional:  Negative for chills and fever. HENT:  Negative for congestion, drooling, facial swelling, nosebleeds, sinus pressure, sore throat and voice change. Eyes:  Negative for discharge. Respiratory:  Negative for apnea, choking and shortness of breath. Cardiovascular:  Negative for chest pain and leg swelling. Gastrointestinal:  Negative for abdominal pain, blood in stool, constipation, diarrhea and nausea. Genitourinary:  Negative for dysuria and enuresis. Musculoskeletal:  Negative for joint swelling. Right hip pain   Skin:  Negative for rash and wound. Neurological:  Negative for seizures and syncope. Psychiatric/Behavioral:  Negative for behavioral problems, hallucinations and suicidal ideas. All other systems reviewed and are negative.     A complete review of systems was performed and is negative except as noted above in the HPI. PAST MEDICAL HISTORY     Past Medical History:   Diagnosis Date    Atrial fibrillation [facility])     Cardiac pacemaker     Hypertension     Hypothyroidism          SURGICAL HISTORY       Past Surgical History:   Procedure Laterality Date    APPENDECTOMY      CHOLECYSTECTOMY      EYE SURGERY      HYSTERECTOMY (CERVIX STATUS UNKNOWN)      KNEE SURGERY Right     TONSILLECTOMY           CURRENT MEDICATIONS       Previous Medications    APIXABAN (ELIQUIS) 2.5 MG TABS TABLET    Take 2.5 mg by mouth 2 times daily    ISOSORBIDE MONONITRATE (IMDUR) 30 MG EXTENDED RELEASE TABLET    Take 1 tablet by mouth daily    LEVOTHYROXINE (SYNTHROID) 50 MCG TABLET    Take 50 mcg by mouth daily    LISINOPRIL (PRINIVIL;ZESTRIL) 40 MG TABLET    Take 40 mg by mouth daily    METOPROLOL SUCCINATE (TOPROL XL) 25 MG EXTENDED RELEASE TABLET    Take 25 mg by mouth daily    MIRTAZAPINE (REMERON) 7.5 MG TABLET    Take 7.5 mg by mouth nightly    NITROGLYCERIN (NITROSTAT) 0.4 MG SL TABLET    Place 1 tablet under the tongue every 5 minutes as needed for Chest pain up to max of 3 total doses. If no relief after 1 dose, call 911. UNABLE TO FIND    in the morning and at bedtime Vitc 250mg-mg-E 90mg-zinc 40mg-copper 1mg- lutein-zeaxan chew tablet    VITAMIN D (CHOLECALCIFEROL) 25 MCG (1000 UT) TABS TABLET    Take 1,000 Units by mouth daily       ALLERGIES     Latex, Balsam peru-castor oil, Hydrocodone, Morphine, Other, and Sulfa antibiotics    FAMILY HISTORY     History reviewed. No pertinent family history. SOCIAL HISTORY       Social History     Socioeconomic History    Marital status:       Spouse name: None    Number of children: None    Years of education: None    Highest education level: None   Tobacco Use    Smoking status: Never    Smokeless tobacco: Never   Vaping Use    Vaping Use: Never used   Substance and Sexual Activity    Alcohol use: Not Currently    Drug use: Not Currently       SCREENINGS Ramez Coma Scale  Eye Opening: Spontaneous  Best Verbal Response: Oriented  Best Motor Response: Obeys commands  Ramez Coma Scale Score: 15        PHYSICAL EXAM    (up to 7 for level 4, 8 or more for level 5)     ED Triage Vitals   BP Temp Temp src Pulse Resp SpO2 Height Weight   -- -- -- -- -- -- -- --       Physical Exam  Vitals and nursing note reviewed. Constitutional:       Appearance: She is well-developed. Comments: Having acute pain right hip   HENT:      Head: Normocephalic and atraumatic. Right Ear: External ear normal.      Left Ear: External ear normal.      Mouth/Throat:      Pharynx: Oropharynx is clear. Eyes:      General: No scleral icterus. Conjunctiva/sclera: Conjunctivae normal.      Pupils: Pupils are equal, round, and reactive to light. Cardiovascular:      Rate and Rhythm: Normal rate and regular rhythm. Pulses: Normal pulses. Heart sounds: Normal heart sounds. No murmur heard. Pulmonary:      Effort: Pulmonary effort is normal. No respiratory distress. Breath sounds: Normal breath sounds. Abdominal:      General: Bowel sounds are normal.      Palpations: Abdomen is soft. Musculoskeletal:         General: Tenderness (Pain in the right hip. Grossly normal external appearance) present. Normal range of motion. Cervical back: Normal range of motion and neck supple. Skin:     General: Skin is warm and dry. Coloration: Skin is not jaundiced or pale. Neurological:      General: No focal deficit present. Mental Status: She is alert and oriented to person, place, and time. Psychiatric:         Behavior: Behavior normal.       DIAGNOSTIC RESULTS     EKG: All EKG's are interpreted by the Emergency Department Physician who either signs or Co-signs this chart in the absence of a cardiologist.    Sinus rhythm rate 92. IN interval 160. QTc 473. She has some ventricular trigeminy noted.     RADIOLOGY:   Non-plain film images such as CT, Ultrasound and MRI are read by the radiologist. Beto Griffith images are visualized and preliminarily interpreted by the emergency physician with the below findings:    Reviewed the results and images of the chest and hip pelvis x-ray. The CTs are pending at this time. Interpretation per the Radiologist below, if available at the time of this note:    XR CHEST PORTABLE   Final Result   No acute cardiopulmonary process identified. XR HIP 2-3 VW W PELVIS RIGHT   Final Result   The bones are diffusely demineralized. Question nondisplaced right   inferior pubic ramus fracture and minimally displaced left superior pubic ramus   fracture. Intramedullary sudhir is seen in the right femur. The hardware appears   intact. A CT showed be performed for further characterization. Vascular   calcifications.       CT PELVIS WO CONTRAST Additional Contrast? None    (Results Pending)   CT Head W/O Contrast    (Results Pending)   CT LUMBAR SPINE WO CONTRAST    (Results Pending)         ED BEDSIDE ULTRASOUND:   Performed by ED Physician - none    LABS:  Labs Reviewed   CBC WITH AUTO DIFFERENTIAL - Abnormal; Notable for the following components:       Result Value    WBC 12.1 (*)     MCHC 31.5 (*)     Neutrophils % 86.3 (*)     Lymphocytes % 7.0 (*)     Neutrophils Absolute 10.4 (*)     Lymphocytes Absolute 0.9 (*)     All other components within normal limits   COMPREHENSIVE METABOLIC PANEL - Abnormal; Notable for the following components:    CO2 31 (*)     Glucose 118 (*)     AST 36 (*)     All other components within normal limits   PROTIME-INR - Abnormal; Notable for the following components:    Protime 15.5 (*)     INR 1.23 (*)     All other components within normal limits   APTT - Abnormal; Notable for the following components:    aPTT 36.6 (*)     All other components within normal limits   COVID-19, RAPID   URINALYSIS WITH REFLEX TO CULTURE       All other labs were within normal range or not returned as of this dictation. EMERGENCY DEPARTMENT COURSE and DIFFERENTIALDIAGNOSIS/MDM:   Vitals:    Vitals:    12/16/22 1307 12/16/22 1405   BP: (!) 168/100 (!) 153/87   Pulse: 90    Resp: 20    Temp: 98.1 °F (36.7 °C)    TempSrc: Oral    SpO2: 95%    Weight: 101 lb (45.8 kg)    Height: 5' 2\" (1.575 m)        MDM  Number of Diagnoses or Management Options  Closed fracture of other parts of pelvis, initial encounter [facility])  Diagnosis management comments: With help from staff patient was logrolled off the spine board. 3 PM.  Needs more pain medication x-ray looks like it is. Inferior pelvic ramus fracture. She is already had a pinning of that right hip. The sister asked me the results of her head scan. Initially I was not going to get it because she is neurologically intact and looks like minimal head injury. But with her pelvis fracture being a distracting injury I will go ahead and get since I am scanning her pelvis now per radiology. I tried to set the patient up gently and she complained of lower pelvic pain. I am going to include L-spine CT to rule out lower spine involvement. What I see on plain films looks good on her pelvis x-ray. I will talk to the hospitalist service for admission she will have to have PT and rehab.    4:40 PM.  Patient sisters walking the hallway asked her if there were any needs she states the patient's not getting adequate pain relief and is crying in pain. In the. She has had some more Dilaudid. I am going to give her some fentanyl and some Ativan. Because she is having some muscle spasms at aggravating her symptoms. CONSULTS:  IP CONSULT TO ORTHOPEDIC SURGERY  IP CONSULT TO SOCIAL WORK    PROCEDURES:  Unless otherwise notedbelow, none     Procedures    FINAL IMPRESSION     1.  Closed fracture of other parts of pelvis, initial encounter ([facility] [address]          DISPOSITION/PLAN   DISPOSITION Admitted 12/16/2022 03:20:32 PM      PATIENT REFERRED TO:  @FUP@    DISCHARGE MEDICATIONS:  New Prescriptions    No medications on file          (Please note that portions of this note were completed with a voice recognition program.  Efforts were made to edit the dictations butoccasionally words are mis-transcribed.)    Inocencia Burleson MD (electronically signed)  AttendingEmergency Physician          Dang Simons MD  12/16/22 [address] [place] [address] Rosy Parekh MD  12/16/22 3408

## 2022-12-17 ENCOUNTER — APPOINTMENT (OUTPATIENT)
Dept: CT IMAGING | Age: 87
DRG: 535 | End: 2022-12-17
Payer: MEDICARE

## 2022-12-17 LAB
ANION GAP SERPL CALCULATED.3IONS-SCNC: 10 MMOL/L (ref 7–19)
BACTERIA: NEGATIVE /HPF
BILIRUBIN URINE: NEGATIVE
BLOOD, URINE: NEGATIVE
BUN BLDV-MCNC: 24 MG/DL (ref 8–23)
CALCIUM SERPL-MCNC: 8.3 MG/DL (ref 8.8–10.2)
CHLORIDE BLD-SCNC: 107 MMOL/L (ref 98–111)
CLARITY: CLEAR
CO2: 27 MMOL/L (ref 22–29)
COLOR: YELLOW
CREAT SERPL-MCNC: 0.7 MG/DL (ref 0.5–0.9)
CRYSTALS, UA: ABNORMAL /HPF
D DIMER: >20 UG/ML FEU (ref 0–0.48)
EPITHELIAL CELLS, UA: 1 /HPF (ref 0–5)
FOLATE: 16.4 NG/ML (ref 4.8–37.3)
GFR SERPL CREATININE-BSD FRML MDRD: >60 ML/MIN/{1.73_M2}
GLUCOSE BLD-MCNC: 104 MG/DL (ref 74–109)
GLUCOSE URINE: NEGATIVE MG/DL
HCT VFR BLD CALC: 41.3 % (ref 37–47)
HEMOGLOBIN: 11.9 G/DL (ref 12–16)
HYALINE CASTS: 0 /HPF (ref 0–8)
KETONES, URINE: ABNORMAL MG/DL
LEUKOCYTE ESTERASE, URINE: NEGATIVE
MAGNESIUM: 1.9 MG/DL (ref 1.7–2.3)
MCH RBC QN AUTO: 30.7 PG (ref 27–31)
MCHC RBC AUTO-ENTMCNC: 28.8 G/DL (ref 33–37)
MCV RBC AUTO: 106.4 FL (ref 81–99)
NITRITE, URINE: NEGATIVE
PDW BLD-RTO: 13.5 % (ref 11.5–14.5)
PH UA: 6.5 (ref 5–8)
PLATELET # BLD: 91 K/UL (ref 130–400)
PMV BLD AUTO: 10.2 FL (ref 9.4–12.3)
POTASSIUM REFLEX MAGNESIUM: 4 MMOL/L (ref 3.5–5)
PRO-BNP: 754 PG/ML (ref 0–1800)
PROTEIN UA: 30 MG/DL
RBC # BLD: 3.88 M/UL (ref 4.2–5.4)
RBC UA: 1 /HPF (ref 0–4)
SODIUM BLD-SCNC: 144 MMOL/L (ref 136–145)
SPECIFIC GRAVITY UA: 1.02 (ref 1–1.03)
UROBILINOGEN, URINE: 1 E.U./DL
VITAMIN B-12: 495 PG/ML (ref 211–946)
VITAMIN D 25-HYDROXY: 31.2 NG/ML
WBC # BLD: 7.8 K/UL (ref 4.8–10.8)
WBC UA: 1 /HPF (ref 0–5)

## 2022-12-17 PROCEDURE — 81001 URINALYSIS AUTO W/SCOPE: CPT

## 2022-12-17 PROCEDURE — 85379 FIBRIN DEGRADATION QUANT: CPT

## 2022-12-17 PROCEDURE — 36415 COLL VENOUS BLD VENIPUNCTURE: CPT

## 2022-12-17 PROCEDURE — 2700000000 HC OXYGEN THERAPY PER DAY

## 2022-12-17 PROCEDURE — 97530 THERAPEUTIC ACTIVITIES: CPT

## 2022-12-17 PROCEDURE — 2580000003 HC RX 258

## 2022-12-17 PROCEDURE — 71275 CT ANGIOGRAPHY CHEST: CPT | Performed by: RADIOLOGY

## 2022-12-17 PROCEDURE — 94761 N-INVAS EAR/PLS OXIMETRY MLT: CPT

## 2022-12-17 PROCEDURE — 83735 ASSAY OF MAGNESIUM: CPT

## 2022-12-17 PROCEDURE — 97161 PT EVAL LOW COMPLEX 20 MIN: CPT

## 2022-12-17 PROCEDURE — 71275 CT ANGIOGRAPHY CHEST: CPT

## 2022-12-17 PROCEDURE — 82607 VITAMIN B-12: CPT

## 2022-12-17 PROCEDURE — 87040 BLOOD CULTURE FOR BACTERIA: CPT

## 2022-12-17 PROCEDURE — 6360000004 HC RX CONTRAST MEDICATION: Performed by: NURSE PRACTITIONER

## 2022-12-17 PROCEDURE — 82746 ASSAY OF FOLIC ACID SERUM: CPT

## 2022-12-17 PROCEDURE — 6370000000 HC RX 637 (ALT 250 FOR IP)

## 2022-12-17 PROCEDURE — 80048 BASIC METABOLIC PNL TOTAL CA: CPT

## 2022-12-17 PROCEDURE — 85027 COMPLETE CBC AUTOMATED: CPT

## 2022-12-17 PROCEDURE — 82306 VITAMIN D 25 HYDROXY: CPT

## 2022-12-17 PROCEDURE — 1210000000 HC MED SURG R&B

## 2022-12-17 PROCEDURE — 83880 ASSAY OF NATRIURETIC PEPTIDE: CPT

## 2022-12-17 RX ADMIN — METOPROLOL SUCCINATE 25 MG: 25 TABLET, EXTENDED RELEASE ORAL at 08:23

## 2022-12-17 RX ADMIN — Medication 1000 UNITS: at 08:23

## 2022-12-17 RX ADMIN — SODIUM CHLORIDE, PRESERVATIVE FREE 10 ML: 5 INJECTION INTRAVENOUS at 23:26

## 2022-12-17 RX ADMIN — ACETAMINOPHEN 650 MG: 325 TABLET, FILM COATED ORAL at 18:12

## 2022-12-17 RX ADMIN — ACETAMINOPHEN 650 MG: 325 TABLET, FILM COATED ORAL at 08:22

## 2022-12-17 RX ADMIN — IOPAMIDOL 70 ML: 755 INJECTION, SOLUTION INTRAVENOUS at 11:32

## 2022-12-17 RX ADMIN — APIXABAN 2.5 MG: 2.5 TABLET, FILM COATED ORAL at 23:26

## 2022-12-17 RX ADMIN — LEVOTHYROXINE SODIUM 50 MCG: 50 TABLET ORAL at 06:08

## 2022-12-17 ASSESSMENT — ENCOUNTER SYMPTOMS
WHEEZING: 0
SORE THROAT: 0
BLOOD IN STOOL: 0
ABDOMINAL PAIN: 0
COUGH: 0
VOMITING: 0
SINUS PAIN: 0
CONSTIPATION: 0
DIARRHEA: 0
TROUBLE SWALLOWING: 0
ABDOMINAL DISTENTION: 0
NAUSEA: 0
SHORTNESS OF BREATH: 0

## 2022-12-17 ASSESSMENT — PAIN SCALES - GENERAL: PAINLEVEL_OUTOF10: 0

## 2022-12-17 NOTE — PROGRESS NOTES
Physical Therapy  Facility/Department: [facility] 5 SURG SERVICES  Physical Therapy Initial Assessment    Name: Roly Oliver  : 1932  MRN: [id number]  Date of Service: 2022    Discharge Recommendations:  Subacute/Skilled Nursing Facility, IP Rehab, [address] with PT, Patient would benefit from continued therapy after discharge          Patient Diagnosis(es): The encounter diagnosis was Closed fracture of other parts of pelvis, initial encounter ([facility] [address] Past Medical History:  has a past medical history of Atrial fibrillation [facility]), Cardiac pacemaker, Hypertension, and Hypothyroidism. Past Surgical History:  has a past surgical history that includes Appendectomy; Tonsillectomy; Hysterectomy; knee surgery (Right); Cholecystectomy; and Eye surgery.     Assessment   Body Structures, Functions, Activity Limitations Requiring Skilled Therapeutic Intervention: Decreased functional mobility   Assessment: Patient will benefit from continuing skilled physical therapy to improve mobility  Treatment Diagnosis: decline in mobility  Therapy Prognosis: Good  Decision Making: Low Complexity  Requires PT Follow-Up: Yes  Activity Tolerance  Activity Tolerance: Patient tolerated evaluation without incident     Plan   Physcial Therapy Plan  Days Per Week: 7 Days  Therapy Duration: 2 Weeks  Current Treatment Recommendations: Strengthening, Balance training, Functional mobility training, Transfer training, Gait training  Safety Devices  Type of Devices: Call light within reach, Gait belt, Left in chair, Nurse notified     Restrictions        Subjective   Pain: No c/o pain  General  Chart Reviewed: Yes  Patient assessed for rehabilitation services?: Yes  Diagnosis: Pelvic fracture  Follows Commands: Within Functional Limits  Subjective  Subjective: Agrees to work with therapy         Social/Functional History  Social/Functional History  Lives With: Alone  Type of Home: Apartment  Home Layout: One level  Home Access: Level entry  Bathroom Shower/Tub: Walk-in shower  Bathroom Toilet: Standard  Bathroom Equipment: Grab bars in shower, Built-in shower seat  Bathroom Accessibility: Accessible  Home Equipment: yamila Bustos, [address] [facility] Help From: Family  ADL Assistance: Independent  Homemaking Assistance: Independent  Homemaking Responsibilities: Yes  Ambulation Assistance: Independent  Transfer Assistance: Independent  Active : No  Patient's  Info: Family  Mode of Transportation: Car  Occupation: Retired  Vision/Hearing       Cognition   Orientation  Overall Orientation Status: Within Functional Limits  Cognition  Overall Cognitive Status: WFL     Objective   Heart Rate: 86  Heart Rate Source: Monitor  BP: 128/79  BP Location: Right upper arm  Patient Position: Sitting  MAP (Calculated): 95  Resp: 16  SpO2: 99 %  O2 Device: Nasal cannula              PROM RLE (degrees)  RLE PROM: WFL  PROM LLE (degrees)  LLE PROM: WFL  Strength RLE  Comment: Functional strength due to fracture 2/5  Strength LLE  Comment: Functional strength due to fracture 2/5           Bed mobility  Supine to Sit: Maximum assistance  Sit to Supine: Maximum assistance  Scooting: Maximal assistance  Transfers  Sit to Stand: Maximum Assistance  Stand to Sit: Moderate Assistance  Bed to Chair: Minimal assistance  Stand Pivot Transfers: Minimal Assistance  Ambulation  WB Status: WBAT  Ambulation  Device: Rolling Walker  Assistance: Minimal assistance  Gait Deviations: Slow Zahraa;Decreased step length;Decreased step height  Distance: 5 steps to chair     Balance  Posture: Fair  Sitting - Static: Fair  Sitting - Dynamic: Fair  Standing - Static: Fair;-  Standing - Dynamic: Fair;-    Goals  Short Term Goals  Time Frame for Short Term Goals: 2 weeks  Short Term Goal 1: Require no more than minimal assist for bed mobility  Short Term Goal 2: Require no more than minimal assist for sit to stand transfers  Short Term Goal 3: Ambulate 30 feet with assistive device and minimum assist of 1       Education  Patient Education  Education Given To: Patient  Education Provided: Precautions  Education Method: Verbal  Barriers to Learning: None  Education Outcome: Verbalized understanding      Therapy Time   Individual Concurrent Group Co-treatment   Time In           Time Out           Minutes                   Alyson Muir PT   Electronically signed by Alyson Muir PT on 12/17/2022 at 11:20 AM

## 2022-12-17 NOTE — PROGRESS NOTES
Comprehensive Nutrition Assessment    Type and Reason for Visit:  Initial    Nutrition Recommendations/Plan:   Continue current POC     Malnutrition Assessment:  Malnutrition Status:  No malnutrition (12/17/22 1415)    Context:  Acute Illness     Findings of the 6 clinical characteristics of malnutrition:  Energy Intake:  Mild decrease in energy intake (Comment)  Weight Loss:  No significant weight loss     Body Fat Loss: Moderate body fat loss Orbital, Buccal region   Muscle Mass Loss:  Mild muscle mass loss    Fluid Accumulation:  No significant fluid accumulation     Strength:  Not Performed    Nutrition Assessment:    Following patient for low BMI = 18.5. Pt receiveig a regular diet, pt's po intake is decreased d/t pain. Nutrition Related Findings:      Wound Type: None       Current Nutrition Intake & Therapies:    Average Meal Intake: 1-25%  Average Supplements Intake: None Ordered  ADULT DIET; Regular    Anthropometric Measures:  Height: 5' 2\" (157.5 cm)  Ideal Body Weight (IBW): 110 lbs (50 kg)    Admission Body Weight: 101 lb (45.8 kg)  Current Body Weight: 101 lb (45.8 kg), 91.8 % IBW.     Current BMI (kg/m2): 18.5  Usual Body Weight: 100 lb (45.4 kg) (9/2022)  % Weight Change (Calculated): 1  BMI Categories: Underweight (BMI less than 22) age over 72    Estimated Daily Nutrient Needs:  Energy Requirements Based On: Kcal/kg  Weight Used for Energy Requirements: Current  Energy (kcal/day): 4826-1829 kcals(30-35 kcals/kg)  Weight Used for Protein Requirements: Current  Protein (g/day): 69  Method Used for Fluid Requirements: 1 ml/kcal  Fluid (ml/day): 9583-3705 ml    Nutrition Diagnosis:   Inadequate oral intake related to acute injury/trauma as evidenced by intake 0-25%    Nutrition Interventions:   Food and/or Nutrient Delivery: Continue Current Diet  Nutrition Education/Counseling: No recommendation at this time  Coordination of Nutrition Care: Continue to monitor while inpatient       Goals: Goals: PO intake 50% or greater       Nutrition Monitoring and Evaluation:   Behavioral-Environmental Outcomes: None Identified  Food/Nutrient Intake Outcomes: Food and Nutrient Intake  Physical Signs/Symptoms Outcomes: Fluid Status or Edema, Weight, Skin    Discharge Planning:    Continue current diet     Belkis Coker MS, RD, LD  Contact: [phone]

## 2022-12-17 NOTE — PROGRESS NOTES
ists      Progress Note    Patient:  Tomás Lyn  YOB: 1932  Date of Service: 12/17/2022  MRN: [id number]   Acct: [de-identified]   Primary Care Physician: Rima Ramirez  Advance Directive: Full Code  Admit Date: 12/16/2022       Hospital Day: 1    Portions of this note have been copied forward, however, updated to reflect the most current clinical status of this patient. CHIEF COMPLAINT Fall     SUBJECTIVE:  Ms. Eric Blood was resting in bed this morning. Reported tolerable Left hip pain. Denies SOB or chest pain at this time. CUMULATIVE HOSPITAL COURSE:   The patient is a 80 y.o. female with PMH of HTN, hypothyroidism, AF who presented to [facility] ED with complaints of fall. Stated she became dizzy, lightheadedness and LOC on day of admission. Reported landing on her right hip and head injury. Denied loss of bowel or bladder. Stated she was unable to ambulate after fall. Received IV fentanyl and IV Ativan due to increased pain in ED. Upon arrival to floor rapid response was called due to oxygen saturation in the 60s. Blaine Johnson NP and Dr. Rene Payne present and 0.2 mg Narcan given, patient arousable to speech, and answering questions appropriately. Continuous pulse oximetry and monitor on telemetry on medical floor at this time. Work-up in ER CXR no acute cardiopulmonary process, CT lumbar spine no acute fracture or subluxation, CT head no acute intracranial abnormality, CT pelvis multiple fractures of right sacrum, right inferior pubic rami and left pubic symphysis, no subluxation or dislocation, soft tissue swelling/blood products to the angulated right inferior pubic rami fracture, right hip prosthetic appears intact. Patient was admitted to hospital medicine for pelvic fracture with orthopedic surgery consultation. Orthopedic surgery recommended nonoperative treatment, pain control, DVT prophylaxis, weightbearing as tolerated with a walker.            Review of Systems Constitutional:  Negative for chills, diaphoresis, fatigue and fever. HENT:  Negative for congestion, ear pain, sinus pain, sore throat and trouble swallowing. Eyes:  Negative for visual disturbance. Respiratory:  Negative for cough, shortness of breath and wheezing. Cardiovascular:  Negative for chest pain, palpitations and leg swelling. Gastrointestinal:  Negative for abdominal distention, abdominal pain, blood in stool, constipation, diarrhea, nausea and vomiting. Endocrine: Negative for cold intolerance and heat intolerance. Genitourinary:  Negative for difficulty urinating, flank pain, frequency and urgency. Musculoskeletal:  Positive for arthralgias. Negative for myalgias. Left hip pain    Neurological:  Negative for dizziness, syncope, weakness, light-headedness, numbness and headaches. Hematological:  Does not bruise/bleed easily. Psychiatric/Behavioral:  Negative for agitation, confusion and dysphoric mood. Objective:   VITALS:  /79   Pulse 86   Temp 100.2 °F (37.9 °C) (Oral)   Resp 16   Ht 5' 2\" (1.575 m)   Wt 101 lb (45.8 kg)   SpO2 99%   BMI 18.47 kg/m²   24HR INTAKE/OUTPUT:    Intake/Output Summary (Last 24 hours) at 12/17/2022 1346  Last data filed at 12/17/2022 0605  Gross per 24 hour   Intake 1564 ml   Output 1000 ml   Net 564 ml       Physical Exam  Constitutional:       General: She is not in acute distress. Appearance: Normal appearance. She is not toxic-appearing or diaphoretic. HENT:      Head: Normocephalic and atraumatic. Right Ear: External ear normal.      Left Ear: External ear normal.      Nose: Nose normal. No congestion or rhinorrhea. Mouth/Throat:      Mouth: Mucous membranes are moist.      Pharynx: Oropharynx is clear. Eyes:      General: No scleral icterus. Extraocular Movements: Extraocular movements intact.       Conjunctiva/sclera: Conjunctivae normal.   Cardiovascular:      Rate and Rhythm: Normal rate and regular rhythm. Pulses: Normal pulses. Heart sounds: Normal heart sounds. No murmur heard. No friction rub. No gallop. Pulmonary:      Effort: Pulmonary effort is normal. No respiratory distress. Breath sounds: Normal breath sounds. No wheezing, rhonchi or rales. Abdominal:      General: Abdomen is flat. Bowel sounds are normal. There is no distension. Palpations: Abdomen is soft. Tenderness: There is no abdominal tenderness. Musculoskeletal:         General: Tenderness present. No swelling. Normal range of motion. Cervical back: Normal range of motion and neck supple. Right lower leg: No edema. Left lower leg: No edema. Skin:     General: Skin is warm and dry. Coloration: Skin is not jaundiced. Findings: No erythema, lesion or rash. Neurological:      General: No focal deficit present. Mental Status: She is alert and oriented to person, place, and time. Mental status is at baseline. Cranial Nerves: No cranial nerve deficit. Sensory: No sensory deficit. Motor: No weakness. Psychiatric:         Mood and Affect: Mood normal.         Behavior: Behavior normal.         Thought Content: Thought content normal.         Judgment: Judgment normal.          Medications:      sodium chloride 100 mL/hr at 12/16/22 1712    sodium chloride        sodium chloride flush  5-40 mL IntraVENous 2 times per day    isosorbide mononitrate  30 mg Oral Daily    levothyroxine  50 mcg Oral Daily    lisinopril  40 mg Oral Daily    metoprolol succinate  25 mg Oral Daily    Vitamin D  1,000 Units Oral Daily    [Held by provider] apixaban  2.5 mg Oral BID     sodium chloride flush, sodium chloride, ondansetron **OR** ondansetron, polyethylene glycol, acetaminophen **OR** acetaminophen  ADULT DIET;  Regular     Lab and other Data:     Recent Labs     12/16/22  1333 12/17/22  0342   WBC 12.1* 7.8   HGB 14.0 11.9*    91*     Recent Labs     12/16/22  1333 12/16/22 2229 12/17/22  0342     --  144   K 3.7 3.7 4.0     --  107   CO2 31*  --  27   BUN 21  --  24*   CREATININE 0.8  --  0.7   GLUCOSE 118*  --  104     Recent Labs     12/16/22  1333   AST 36*   ALT 28   BILITOT 0.9   ALKPHOS 75       INR:   Recent Labs     12/16/22  1333   INR 1.23*     UA:  Recent Labs     12/17/22  0606   COLORU YELLOW   PHUR 6.5   WBCUA 1   RBCUA 1   BACTERIA NEGATIVE*   CLARITYU CLEAR   SPECGRAV 1.020   LEUKOCYTESUR Negative   UROBILINOGEN 1.0   BILIRUBINUR Negative   BLOODU Negative   GLUCOSEU Negative     ABG:  Recent Labs     12/16/22 2229   PHART 7.430   KBB8YIJ 48.0*   PO2ART 95.0   LPL1VTO 31.9*   BEART 6.5*   HGBAE 12.4   X9FFRVHG 95.6   CARBOXHGBART 2.1       RAD:     CT Head W/O Contrast  Result Date: 12/16/2022     1. No acute intracranial abnormality. 2. Mild chronic changes the brain parenchyma from small vessel ischemic. 3. Age-appropriate atrophic changes. Recommendation: Follow up as clinically indicated. All CT scans at this facility utilize dose modulation, iterative reconstruction, and/or weight based dosing when appropriate to reduce radiation dose to as low as reasonably achievable. Dictated and Electronically Signed by Michelle Jo MD, [facility] CERTIFIED at [id number] 05:55:38 PM               CT LUMBAR SPINE WO CONTRAST  Result Date: 12/16/2022    1. Multilevel lumbar spondylosis which is moderate to marked in severity. Multilevel mild to moderate neural foraminal stenosis. No severe bony canal stenosis. 2. No acute abnormality. 3. Retroperitoneal soft tissues are unremarkable. Recommendation: Follow up as clinically indicated. All CT scans at this facility utilize dose modulation, iterative reconstruction, and/or weight based dosing when appropriate to reduce radiation dose to as low as reasonably achievable.  Dictated and Electronically Signed by Michelle Jo MD, [address] [place] [address] at [id number] 05:46:42 PM               CT PELVIS WO CONTRAST Additional Contrast? None  Result Date: 12/16/2022    1. Multiple fractures of the right sacrum, right inferior pubic rami and left pubic symphysis. 2. No subluxation or dislocation. 3. Soft tissue swelling/blood products adjacent to the angulated right inferior pubic rami fracture. 4. Right hip prosthesis appears intact. Recommendation: Follow up as clinically indicated. All CT scans at this facility utilize dose modulation, iterative reconstruction, and/or weight based dosing when appropriate to reduce radiation dose to as low as reasonably achievable. Dictated and Electronically Signed by Moira Garcia MD, [facility] CERTIFIED at [id number] 05:52:09 PM               XR CHEST PORTABLE  Result Date: 12/16/2022    No acute cardiopulmonary process identified. XR HIP 2-3 VW W PELVIS RIGHT  Result Date: 12/16/2022    The bones are diffusely demineralized. Question nondisplaced right inferior pubic ramus fracture and minimally displaced left superior pubic ramus fracture. Intramedullary sudhir is seen in the right femur. The hardware appears intact. A CT showed be performed for further characterization. Vascular calcifications. Mohan Campbell [[id number]] Collected: 12/16/22 1333   Order Status: Completed Specimen: Nasopharyngeal Swab Updated: 12/16/22 1402    SARS-CoV-2, NAAT Not Detected       Assessment/Plan   Principal Problem:    Closed fracture of other parts of pelvis, initial encounter [facility])  Active Problems:    Fall    Syncope and collapse  Resolved Problems:    * No resolved hospital problems.  *      Principal Problem:    Closed fracture of other parts of pelvis, initial encounter [facility])-    - Orthopedic surgery consulted    - Recommended nonoperative treatment, pain control, DVT prophylaxis, weightbearing as tolerated with a walker.   - Pain control   - PT/OT   - Case management assisting with DC planning        Active Problems:    Fall/ Syncope and collapse-    - Recent ECHO (11/29/2022) indicated trace TR, mild AR, severe TR, preserved LVEF 63%, grade 1 diastolic dysfunction    - neuro checks    - Orthostatic vitals    - fall precautions    - monitor on telemetry    - UA unremarkable     Elevated Ddimer -    - CTA pulmonary to rule out PE   - Supplemental oxygen as needed         DVT Prophylaxis: Eliquis     Discharge planning: Case management assisting with DC planning      Further Orders per Clinical course/attending. Electronically signed by YOVANA Lowery CNP on 12/17/2022 at 1:46 PM       EMR Dragon/Transcription disclaimer:   Much of this encounter note is an electronic transcription/translation of spoken language to printed text.  The electronic translation of spoken language may permit erroneous, or at times, nonsensical words or phrases to be inadvertently transcribed; although attempts have made to review the note for such errors, some may still exist.

## 2022-12-17 NOTE — CONSULTS
Orthopaedic Inpatient Consultation    NAME:  Tomás Lyn   : 1932  MRN: [id number]    2022 12:59 PM        CHIEF COMPLAINT:  right hip pain      HISTORY OF PRESENT ILLNESS:   The patient is a 80 y.o. female who lives alone in the [address] [place] [address] area who presents with the above complaint after a fall at home yesterday. Lost her balance. No loss of consciousness. Complains of deep right buttock low back and right groin pain. No prior problems with her hip except for an inner troches femur fracture treated many years ago. No other complaints of pain. Past Medical History:        Diagnosis Date    Atrial fibrillation [facility])     Cardiac pacemaker     Hypertension     Hypothyroidism        Past Surgical History:        Procedure Laterality Date    APPENDECTOMY      CHOLECYSTECTOMY      EYE SURGERY      HYSTERECTOMY (CERVIX STATUS UNKNOWN)      KNEE SURGERY Right     TONSILLECTOMY         Current Medications:   Prior to Admission medications    Medication Sig Start Date End Date Taking? Authorizing Provider   mirtazapine (REMERON) 7.5 MG tablet Take 7.5 mg by mouth nightly    Historical Provider, MD   nitroGLYCERIN (NITROSTAT) 0.4 MG SL tablet Place 1 tablet under the tongue every 5 minutes as needed for Chest pain up to max of 3 total doses.  If no relief after 1 dose, call 911. 22   Angel Garner MD   isosorbide mononitrate (IMDUR) 30 MG extended release tablet Take 1 tablet by mouth daily 22   Angel Garner MD   vitamin D (CHOLECALCIFEROL) 25 MCG (1000 UT) TABS tablet Take 1,000 Units by mouth daily    Historical Provider, MD   levothyroxine (SYNTHROID) 50 MCG tablet Take 50 mcg by mouth daily 14   Historical Provider, MD   metoprolol succinate (TOPROL XL) 25 MG extended release tablet Take 25 mg by mouth daily 8/14/15   Historical Provider, MD   lisinopril (PRINIVIL;ZESTRIL) 40 MG tablet Take 40 mg by mouth daily    Historical Provider, MD Marina Nathans[facility] [address] [facility] 1045 in the morning and at bedtime Vitc 250mg-mg-E 90mg-zinc 40mg-copper 1mg- lutein-zeaxan chew tablet    Historical Provider, MD   apixaban (ELIQUIS) 2.5 MG TABS tablet Take 2.5 mg by mouth 2 times daily    Historical Provider, MD       Allergies:  Latex, Balsam peru-castor oil, Hydrocodone, Morphine, Other, and Sulfa antibiotics    Social History:   Social History     Socioeconomic History    Marital status:      Spouse name: Not on file    Number of children: Not on file    Years of education: Not on file    Highest education level: Not on file   Occupational History    Not on file   Tobacco Use    Smoking status: Never    Smokeless tobacco: Never   Vaping Use    Vaping Use: Never used   Substance and Sexual Activity    Alcohol use: Not Currently    Drug use: Not Currently    Sexual activity: Not on file   Other Topics Concern    Not on file   Social History Narrative    Not on file     Social Determinants of Health     Financial Resource Strain: Not on file   Food Insecurity: Not on file   Transportation Needs: Not on file   Physical Activity: Not on file   Stress: Not on file   Social Connections: Not on file   Intimate Partner Violence: Not on file   Housing Stability: Not on file       Family History:   History reviewed. No pertinent family history. REVIEW OF SYSTEMS:  14 point review of systems has been reviewed from the patient's emergency room visit, reviewed with the patient on today's date with no new changes. PHYSICAL EXAM:      Physical Examination:  Vitals:   Vitals:    12/16/22 1800 12/16/22 2008 12/16/22 2130 12/17/22 0750   BP:  116/78  128/79   Pulse:  93 90 86   Resp:  18  16   Temp:  98.6 °F (37 °C)  100.2 °F (37.9 °C)   TempSrc:  Temporal  Oral   SpO2: 100% 100%  99%   Weight:       Height:         General:  Appears stated age, no distress. Orientation:  Alert and oriented to time, place, and person. Mood and Affect:  Cooperative and pleasant. Gait:  Resting comfortably in bed.   Cardiovascular: Symmetric 1-2 plus pulses in upper and lower extremities. Lymph:  No cervical or inguinal lymphadenopathy noted. Sensation:  Grossly intact to light touch. DTR:  Normal, no pathologic reflexes. Coordination/balance:  Normal    Musculoskeletal:  Right upper extremity exam:  There is no tenderness to palpation about the shoulder, elbow, wrist or hand. Range of motion normal  .  5/5 strength, normal sensation, good radial pulse and skin is normal.      Left upper extremity exam:  There is no tenderness to palpation about the shoulder, elbow, wrist or hand. Range of motion normal .   5/5 strength, normal sensation, good radial pulse and skin is normal.     Right lower extremity exam: Tender to palpation about the right hip. No shortening of the extremity. Gentle range of motion passively of the right hip is met with pain. 5/5 strength, normal sensation, good dorsalis pedis pulse  and skin is normal.     Left lower extremity exam:  There is no tenderness to palpation about the hip, knee, ankle or foot.  Range of motion normal .   5/5 strength normal sensation, good dorsalis pedis pulse and skin is normal.      DATA:    CBC with Differential:    Lab Results   Component Value Date/Time    WBC 7.8 12/17/2022 03:42 AM    RBC 3.88 12/17/2022 03:42 AM    HGB 11.9 12/17/2022 03:42 AM    HCT 41.3 12/17/2022 03:42 AM    PLT 91 12/17/2022 03:42 AM    .4 12/17/2022 03:42 AM    MCH 30.7 12/17/2022 03:42 AM    MCHC 28.8 12/17/2022 03:42 AM    RDW 13.5 12/17/2022 03:42 AM    LYMPHOPCT 7.0 12/16/2022 01:33 PM    MONOPCT 4.9 12/16/2022 01:33 PM    BASOPCT 0.4 12/16/2022 01:33 PM    MONOSABS 0.60 12/16/2022 01:33 PM    LYMPHSABS 0.9 12/16/2022 01:33 PM    EOSABS 0.00 12/16/2022 01:33 PM    BASOSABS 0.10 12/16/2022 01:33 PM     CMP:    Lab Results   Component Value Date/Time     12/17/2022 03:42 AM    K 4.0 12/17/2022 03:42 AM    K 3.7 12/16/2022 10:29 PM     12/17/2022 03:42 AM    CO2 27 12/17/2022 03:42 AM BUN 24 12/17/2022 03:42 AM    CREATININE 0.7 12/17/2022 03:42 AM    GFRAA >59 09/23/2022 06:30 PM    LABGLOM >60 12/17/2022 03:42 AM    GLUCOSE 104 12/17/2022 03:42 AM    PROT 6.8 12/16/2022 01:33 PM    CALCIUM 8.3 12/17/2022 03:42 AM    BILITOT 0.9 12/16/2022 01:33 PM    ALKPHOS 75 12/16/2022 01:33 PM    AST 36 12/16/2022 01:33 PM    ALT 28 12/16/2022 01:33 PM     BMP:    Lab Results   Component Value Date/Time     12/17/2022 03:42 AM    K 4.0 12/17/2022 03:42 AM    K 3.7 12/16/2022 10:29 PM     12/17/2022 03:42 AM    CO2 27 12/17/2022 03:42 AM    BUN 24 12/17/2022 03:42 AM    CREATININE 0.7 12/17/2022 03:42 AM    CALCIUM 8.3 12/17/2022 03:42 AM    GFRAA >59 09/23/2022 06:30 PM    LABGLOM >60 12/17/2022 03:42 AM    GLUCOSE 104 12/17/2022 03:42 AM         Radiology: CT Head W/O Contrast    Result Date: 12/16/2022  NO PRIOR REPORT AVAILABLE Exam: CT OF THE BRAIN WITHOUT CONTRAST Clinical data: Fall, hit head, no loss of consciousness, neurologically intact. Technique: Contiguous axial images are obtained from the skull base to vertex without intravenous contrast. Reformatted/MPR images were performed. Radiation dose: CTDIvol =79.50 mGy, DLP =1956 mGy x cm. Portion of the examination is degraded from motion. Prior studies: No prior studies submitted. Findings: Diffusely mild cerebral atrophy. Minimal hypoattenuation the white from small vessel ischemic disease. No acute intracranial abnormality is present. No evidence of acute cortical infarction, hemorrhage, mass or mass effect. No hydrocephalus or abnormal extra-axial fluid collections are present. The posterior fossa is unremarkable. The skull base and calvarium are intact. The included portions of the paranasal sinuses and mastoid air cells are clear. Mild intracranial atherosclerosis. 1. No acute intracranial abnormality. 2. Mild chronic changes the brain parenchyma from small vessel ischemic. 3. Age-appropriate atrophic changes.  Recommendation: Follow up as clinically indicated. All CT scans at this facility utilize dose modulation, iterative reconstruction, and/or weight based dosing when appropriate to reduce radiation dose to as low as reasonably achievable. Dictated and Electronically Signed by Alexandrea Matute MD, SA CERTIFIED at [id number] 05:55:38 PM             CT LUMBAR SPINE WO CONTRAST    Result Date: 12/16/2022  NO PRIOR REPORT AVAILABLE Exam: CT OF THE LUMBAR SPINE WITHOUT INTRAVENOUS CONTRAST Clinical data: Pelvis fracture pain across lower back and pelvis, rule out lumbar spine involvement. Technique: Spiral axial CT images through the lumbar spine were acquired without contrast, reconstructed in axial and sagittal projections and imaged using soft tissue and bone algorithms. Reformatted/MPR images were performed. Radiation dose: CTDIvol =79.5 mGy, DLP =1956 mGy x cm. Limitations: None. Prior studies: No prior studies submitted. Findings:Reverse S-shaped scoliosis. Vertebral body heights are maintained. Moderate to marked multilevel intervertebral disc height loss. Endplate sclerotic changes. Anterior and lateral osteophytes. Facet arthropathy. No facet dislocation. Atherosclerosis of the aorta. No aneurysm. Retroperitoneal soft tissues are unremarkable. There isgrossly unremarkablealignment without acute fracture or subluxation. Inter-vertebral disc spaces: L1-L2: Mild disc bulge. Mild to moderate neural foraminal stenosis. L2-L3: Mild disc bulge. Mild to moderate neural foraminal stenosis. L3-L4: Mild annular disc bulge. Mild to moderate neural foraminal stenosis. L4-L5: Circumferential disc bulge. Partial calcification of the disc. Moderate neural foraminal stenosis. L5-S1: Annular disc bulge measures 3 mm. Bilateral foraminal components. Moderate neural foraminal stenosis. Soft tissues are grossly unremarkable. 1. Multilevel lumbar spondylosis which is moderate to marked in severity.  Multilevel mild to moderate neural foraminal stenosis. No severe bony canal stenosis. 2. No acute abnormality. 3. Retroperitoneal soft tissues are unremarkable. Recommendation: Follow up as clinically indicated. All CT scans at this facility utilize dose modulation, iterative reconstruction, and/or weight based dosing when appropriate to reduce radiation dose to as low as reasonably achievable. Dictated and Electronically Signed by Yifan Sweeney MD, [address] at [id number] 05:46:42 PM             CT PELVIS WO CONTRAST Additional Contrast? None    Result Date: 12/16/2022  NO PRIOR REPORT AVAILABLE Exam: CT OF THE BONY PELVIS WITHOUT CONTRAST - MULTIPLANAR CT RECONSTRUCTION Clinical data: Fall. Questionable pelvis ramus fracture on the right. Technique: Spiral axial CT images through the pelvis were acquired without contrast, reconstructed in multiple projections and imaged using soft tissue and bone algorithms. Reformatted/MPR images were performed. Radiation Dose: CTDIvol = 79.50 mGy, DLP =  1956 mGy x cm. Limitations: None. Prior studies: No prior studies submitted. Findings: Osseous structures: Decrease in bone mineralization. Right-sided surgical hardware appears in position. Acute fractures anteriorly laterally involving the right aspect of the sacrum. This is adjacent to the sacroiliac joint. Second fracture slightly inferiorly involving the right sacrum anteriorly. Fracture line extends to the right sacroiliac joint. A complex angulated fracture of the right inferior pubic rami. Nondisplaced fracture at the anterior left pubic symphysis. No diastasis. No subluxation or dislocation. 9 Soft tissues: No CT evidence of soft tissue mass. Focal asymmetric soft tissue swelling/blood products adjacent to the angulated right inferior pubic rami fracture. Limited CT evaluation of the muscles and tendons are gross unremarkable. 1. Multiple fractures of the right sacrum, right inferior pubic rami and left pubic symphysis. 2. No subluxation or dislocation.  3. Soft tissue swelling/blood products adjacent to the angulated right inferior pubic rami fracture. 4. Right hip prosthesis appears intact. Recommendation: Follow up as clinically indicated. All CT scans at this facility utilize dose modulation, iterative reconstruction, and/or weight based dosing when appropriate to reduce radiation dose to as low as reasonably achievable. Dictated and Electronically Signed by Eleuterio Griffiths MD, [facility] CERTIFIED at [id number] 05:52:09 PM             XR CHEST PORTABLE    Result Date: 12/16/2022  CLINICAL HISTORY: Fall, right hip injury TECHNIQUE: Single AP view of the chest COMPARISON: CXR from 9/23/2022 FINDINGS: Lines and tubes: Left chest pacemaker. Cardiomediastinal: Normal size and configuration of the cardiomediastinal silhouette. No pneumomediastinum. Calcific atherosclerosis of the aortic arch. Lungs and pleura: The lungs are grossly unremarkable. No pleural effusion. No pneumothorax. Musculoskeletal: No acute osseous abnormalities. Osseous degenerative changes. Other: None. No acute cardiopulmonary process identified. XR HIP 2-3 VW W PELVIS RIGHT    Result Date: 12/16/2022  CLINICAL HISTORY: Fall, right hip pain TECHNIQUE: 4 views of the pelvis and right hip COMPARISON: None    The bones are diffusely demineralized. Question nondisplaced right inferior pubic ramus fracture and minimally displaced left superior pubic ramus fracture. Intramedullary sudhir is seen in the right femur. The hardware appears intact. A CT showed be performed for further characterization. Vascular calcifications. Assessment: Right nondisplaced superior inferior pubic ramus fracture and right sacral fractures. Previous right intertrochanteric femur fracture well-healed. Stable injury can be treated nonoperatively. Plan: Nonoperative treatment. Pain control. DVT prophylaxis. Can weight-bear as tolerated with a walker. May need rehab since she lives alone. .        Provider:  Eboni Small, MD  Date: 12/17/2022

## 2022-12-17 NOTE — PROGRESS NOTES
PHYSICAL THERAPY  Daily Treatment Note    DATE:  2022  NAME:  Zara Patel  :  1932  (89 y.o.,female)  MRN:  [id number]      Subjective  General  Chart Reviewed: Yes  Patient assessed for rehabilitation services?: Yes  Diagnosis: Pelvic fracture  Follows Commands: Within Functional Limits  Subjective  Subjective: Agrees to work with therapy          HOME LIVING:  Social/Functional History  Lives With: Alone  Type of Home: Apartment  Home Layout: One level  Home Access: Level entry  Bathroom Shower/Tub: Walk-in shower  Bathroom Toilet: Standard  Bathroom Equipment: Grab bars in shower, Built-in shower seat  Bathroom Accessibility: Accessible  Home Equipment: Grab Pato Austin, standard, Rollator  Receives Help From: Family  ADL Assistance: Independent  Homemaking Assistance: Independent  Homemaking Responsibilities: Yes  Ambulation Assistance: Independent  Transfer Assistance: Independent  Active : No  Patient's  Info: Family  Mode of Transportation: Car  Occupation: Retired    RESTRICTIONS/PRECAUTIONS:         OVERALL  ORIENTATION STATUS:  Overall Orientation Status: Within Stefan [place] 2421  Comment: Functional strength due to fracture 2/5  Strength LLE  Comment: Functional strength due to fracture 2/5    ROM   PROM RLE (degrees)  RLE PROM: WFL  PROM LLE (degrees)  LLE PROM: WFL     BALANCE  Balance  Posture: Fair  Sitting - Static: Fair  Sitting - Dynamic: Fair  Standing - Static: Fair, -  Standing - Dynamic: Fair, -    BED MOBILITY  Bed Mobility  Scooting: Maximal assistance    TRANSFERS  Transfers  Sit to Stand: Maximum Assistance  Stand to Sit: Moderate Assistance  Bed to Chair: Minimal assistance  Stand Pivot Transfers: Minimal Assistance    AMBULATION  Device: Rolling Walker  Assistance: Minimal assistance  Distance: 5 steps to chair    STAIRS       TREATMENT FOCUS THIS VISIT    [] Gait training  [x]Transfer training  [x] Bed mobility  [] Lower extremity exercise  [] Safety and education    COMMENTS:  Returned to chair at patient request  Call light within reach  Patient instructed to request assistance before getting up  Nursing updated        Electronically signed by Lay Rhoades PT on 12/17/2022 at 11:21 AM

## 2022-12-17 NOTE — CARE COORDINATION
GERONIMO met with pt at bedside. Pt stated that if the doctor believed that she needed PT/OT and needed to go to a facility she would go to a SNF in [place]. Pt would prefer HH, but said would do whatever the Dr suggested. Pt stated that they would not be able to do outpatient therapy due to needing to get a ride. Pt also stated he sister or grandson would be able to give her a ride home.  Awaiting PT/OT souleymane

## 2022-12-18 LAB
ANION GAP SERPL CALCULATED.3IONS-SCNC: 11 MMOL/L (ref 7–19)
BUN BLDV-MCNC: 25 MG/DL (ref 8–23)
CALCIUM SERPL-MCNC: 8.5 MG/DL (ref 8.8–10.2)
CHLORIDE BLD-SCNC: 104 MMOL/L (ref 98–111)
CO2: 27 MMOL/L (ref 22–29)
CREAT SERPL-MCNC: 0.6 MG/DL (ref 0.5–0.9)
GFR SERPL CREATININE-BSD FRML MDRD: >60 ML/MIN/{1.73_M2}
GLUCOSE BLD-MCNC: 87 MG/DL (ref 74–109)
HCT VFR BLD CALC: 38.3 % (ref 37–47)
HEMOGLOBIN: 11.8 G/DL (ref 12–16)
MAGNESIUM: 1.9 MG/DL (ref 1.7–2.3)
MCH RBC QN AUTO: 30.4 PG (ref 27–31)
MCHC RBC AUTO-ENTMCNC: 30.8 G/DL (ref 33–37)
MCV RBC AUTO: 98.7 FL (ref 81–99)
PDW BLD-RTO: 13.6 % (ref 11.5–14.5)
PLATELET # BLD: 91 K/UL (ref 130–400)
PMV BLD AUTO: 11.1 FL (ref 9.4–12.3)
POTASSIUM REFLEX MAGNESIUM: 3.5 MMOL/L (ref 3.5–5)
RBC # BLD: 3.88 M/UL (ref 4.2–5.4)
SODIUM BLD-SCNC: 142 MMOL/L (ref 136–145)
WBC # BLD: 6.1 K/UL (ref 4.8–10.8)

## 2022-12-18 PROCEDURE — 83735 ASSAY OF MAGNESIUM: CPT

## 2022-12-18 PROCEDURE — 94761 N-INVAS EAR/PLS OXIMETRY MLT: CPT

## 2022-12-18 PROCEDURE — 2580000003 HC RX 258

## 2022-12-18 PROCEDURE — 97530 THERAPEUTIC ACTIVITIES: CPT

## 2022-12-18 PROCEDURE — 97165 OT EVAL LOW COMPLEX 30 MIN: CPT

## 2022-12-18 PROCEDURE — 2700000000 HC OXYGEN THERAPY PER DAY

## 2022-12-18 PROCEDURE — 6370000000 HC RX 637 (ALT 250 FOR IP)

## 2022-12-18 PROCEDURE — 1210000000 HC MED SURG R&B

## 2022-12-18 PROCEDURE — 97535 SELF CARE MNGMENT TRAINING: CPT

## 2022-12-18 PROCEDURE — 94760 N-INVAS EAR/PLS OXIMETRY 1: CPT

## 2022-12-18 PROCEDURE — 85027 COMPLETE CBC AUTOMATED: CPT

## 2022-12-18 PROCEDURE — 36415 COLL VENOUS BLD VENIPUNCTURE: CPT

## 2022-12-18 PROCEDURE — 80048 BASIC METABOLIC PNL TOTAL CA: CPT

## 2022-12-18 RX ORDER — POTASSIUM CHLORIDE 7.45 MG/ML
10 INJECTION INTRAVENOUS PRN
Status: DISCONTINUED | OUTPATIENT
Start: 2022-12-18 | End: 2022-12-26 | Stop reason: HOSPADM

## 2022-12-18 RX ORDER — OLANZAPINE 10 MG/1
2.5 INJECTION, POWDER, LYOPHILIZED, FOR SOLUTION INTRAMUSCULAR ONCE
Status: DISCONTINUED | OUTPATIENT
Start: 2022-12-18 | End: 2022-12-21

## 2022-12-18 RX ORDER — POTASSIUM CHLORIDE 20 MEQ/1
40 TABLET, EXTENDED RELEASE ORAL PRN
Status: DISCONTINUED | OUTPATIENT
Start: 2022-12-18 | End: 2022-12-26 | Stop reason: HOSPADM

## 2022-12-18 RX ADMIN — METOPROLOL SUCCINATE 25 MG: 25 TABLET, EXTENDED RELEASE ORAL at 08:46

## 2022-12-18 RX ADMIN — SODIUM CHLORIDE, PRESERVATIVE FREE 10 ML: 5 INJECTION INTRAVENOUS at 20:42

## 2022-12-18 RX ADMIN — LEVOTHYROXINE SODIUM 50 MCG: 50 TABLET ORAL at 04:55

## 2022-12-18 RX ADMIN — APIXABAN 2.5 MG: 2.5 TABLET, FILM COATED ORAL at 20:42

## 2022-12-18 RX ADMIN — Medication 1000 UNITS: at 08:47

## 2022-12-18 RX ADMIN — ISOSORBIDE MONONITRATE 30 MG: 30 TABLET, EXTENDED RELEASE ORAL at 08:46

## 2022-12-18 RX ADMIN — SODIUM CHLORIDE, PRESERVATIVE FREE 10 ML: 5 INJECTION INTRAVENOUS at 08:47

## 2022-12-18 RX ADMIN — ACETAMINOPHEN 650 MG: 325 TABLET, FILM COATED ORAL at 04:55

## 2022-12-18 RX ADMIN — APIXABAN 2.5 MG: 2.5 TABLET, FILM COATED ORAL at 08:47

## 2022-12-18 RX ADMIN — LISINOPRIL 40 MG: 20 TABLET ORAL at 08:46

## 2022-12-18 ASSESSMENT — ENCOUNTER SYMPTOMS
ABDOMINAL PAIN: 0
TROUBLE SWALLOWING: 0
DIARRHEA: 0
SINUS PAIN: 0
BLOOD IN STOOL: 0
NAUSEA: 0
SORE THROAT: 0
VOMITING: 0
CONSTIPATION: 0
COUGH: 0
WHEEZING: 0
ABDOMINAL DISTENTION: 0
SHORTNESS OF BREATH: 0

## 2022-12-18 ASSESSMENT — PAIN DESCRIPTION - LOCATION: LOCATION: PELVIS

## 2022-12-18 ASSESSMENT — PAIN SCALES - GENERAL: PAINLEVEL_OUTOF10: 3

## 2022-12-18 ASSESSMENT — PAIN DESCRIPTION - DESCRIPTORS: DESCRIPTORS: DISCOMFORT

## 2022-12-18 ASSESSMENT — PAIN DESCRIPTION - ORIENTATION: ORIENTATION: LOWER

## 2022-12-18 NOTE — PROGRESS NOTES
Physical Therapy  Name: Sharlene Aguilar  MRN:  [id number]  Date of service:  12/18/2022 12/18/22 1446   Subjective   Subjective Attempt: Pt having a lot of pain right now, did not want to attempt mobility. Attempted to assist pt to reposition in bed but requests to not be moved. Notified nurse.          Electronically signed by Rhett Merritt PTA on 12/18/2022 at 2:48 PM

## 2022-12-18 NOTE — PROGRESS NOTES
Patient has become more confused and has removed telemetry and and clothing, patient also agitated with staff. She does not currently want to be touched. Message sent to the NP to make aware.  NP said to dc telemetry and iv if needed so patient wont pull at lines

## 2022-12-18 NOTE — PROGRESS NOTES
Occupational Therapy  Facility/Department: [facility] SURG SERVICES  Occupational Therapy Initial Assessment    Name: Shmuel Gonzalez  : 1932  MRN: [id number]  Date of Service: 2022    Discharge Recommendations:  Patient would benefit from continued therapy after discharge, 24 hour supervision or assist          Patient Diagnosis(es): The encounter diagnosis was Closed fracture of other parts of pelvis, initial encounter ([facility] [address] Past Medical History:  has a past medical history of Atrial fibrillation [facility]), Cardiac pacemaker, Hypertension, and Hypothyroidism. Past Surgical History:  has a past surgical history that includes Appendectomy; Tonsillectomy; Hysterectomy; knee surgery (Right); Cholecystectomy; and Eye surgery. Treatment Diagnosis: decreased ADL/functional mobility      Assessment   Performance deficits / Impairments: Decreased functional mobility ; Decreased balance;Decreased ADL status; Decreased cognition;Decreased endurance;Decreased high-level IADLs;Decreased strength  Assessment: Pt's ADL and functional mobility impacted by pain in R hip. Pt would benefit from skilled OT to increase independence in ADL and functional transfers/mobility. Anticipate pt will require 24 hr assist at d/c.   Treatment Diagnosis: decreased ADL/functional mobility  REQUIRES OT FOLLOW-UP: Yes  Activity Tolerance  Activity Tolerance: Patient Tolerated treatment well        Plan   Occupational Therapy Plan  Times Per Week: 3-5  Current Treatment Recommendations: Strengthening, Balance training, Functional mobility training, Endurance training, Safety education & training, Pain management, Cognitive reorientation, Patient/Caregiver education & training, Equipment evaluation, education, & procurement, Self-Care / ADL, Home management training     Restrictions  Lower Extremity Weight Bearing Restrictions  Right Lower Extremity Weight Bearing: Weight Bearing As Tolerated  Left Lower Extremity Weight Bearing: Weight Bearing As Tolerated    Subjective   General  Patient assessed for rehabilitation services?: Yes  Diagnosis: pelvic fx     Social/Functional History  Social/Functional History  Lives With: Alone  Type of Home: Apartment  Home Layout: One level  Home Access: Level entry  Bathroom Shower/Tub: Walk-in shower  Bathroom Toilet: Standard  Bathroom Equipment: Built-in shower seat  Bathroom Accessibility: Accessible  Home Equipment: Grab bars, Walker, standard, Rollator, [place] [employer] Help From: Family  ADL Assistance: Independent  Homemaking Assistance: Independent  Homemaking Responsibilities: Yes  Ambulation Assistance: Independent  Transfer Assistance: Independent  Active : No  Patient's  Info: Family  Mode of Transportation: Car  Occupation: Retired       Objective   Heart Rate: 81  Heart Rate Source: Monitor  BP: (!) 161/85  BP Location: Right upper arm  Patient Position: Sitting  MAP (Calculated): 110  Resp: 16  SpO2: 99 %  O2 Device: Nasal cannula             Safety Devices  Type of Devices: Call light within reach;Gait belt;Left in chair;Nurse notified; Chair alarm in place     Toilet Transfers  Toilet - Technique: Stand step  Equipment Used: Standard bedside commode  Toilet Transfer: Minimal assistance (per clinical observation of prerequisite skills)     ADL  Feeding: Setup  Grooming: Setup  UE Bathing: Setup;Stand by assistance  LE Bathing: Moderate assistance;Maximum assistance  UE Dressing: Stand by assistance;Setup  LE Dressing: Maximum assistance  Toileting: Maximum assistance  Additional Comments: ADL scores based on task simulation/clinical observation of prerequisite skills        Bed mobility  Supine to Sit: Maximum assistance  Scooting:  Moderate assistance  Transfers  Sit to stand: Minimal assistance  Stand to sit: Minimal assistance  Vision  Vision: Impaired  Vision Exceptions: Wears glasses at all times  Hearing  Hearing: Within functional limits  Cognition  Overall Cognitive Status: Exceptions  Following Commands:  Follows one step commands with increased time  Memory: Decreased recall of recent events  Cognition Comment: pt alert, follows 1-step directions, some confusion and STM deficits  Orientation  Orientation Level: Oriented to situation;Oriented to person;Disoriented to time;Oriented to place                     LUE AROM (degrees)  LUE AROM : WFL  RUE AROM (degrees)  RUE AROM : WFL                       Goals  Short Term Goals  Time Frame for Short Term Goals: 2 weeks  Short Term Goal 1: Pt will toilet with SBA  Short Term Goal 2: Toilet transfer with SBA, DME prn  Short Term Goal 3: Bathe with SBA, AE/DME prn  Short Term Goal 4: LB dressing with SBA, AE prn  Short Term Goal 5: Perform 1-2 handed functional standing activities with SBA x 3 min to enhance ADL performance  Long Term Goals  Long Term Goal 1: upgrade as tolerated         Melissa Ybarra OT  Electronically signed by Melissa Ybarra OT on 12/18/2022 at 11:04 AM

## 2022-12-18 NOTE — PROGRESS NOTES
Pulse ox on 2lpm at rest 98%  Pulse ox on room air at rest 93%  Pt only pivots to chair or potty, oxygen on standby

## 2022-12-18 NOTE — PROGRESS NOTES
[facility]      Progress Note    Patient:  Camilla Ventura  YOB: 1932  Date of Service: 12/18/2022  MRN: [id number]   Acct: [de-identified]   Primary Care Physician: Colonel Beasley  Advance Directive: Full Code  Admit Date: 12/16/2022       Hospital Day: 2    Portions of this note have been copied forward, however, updated to reflect the most current clinical status of this patient. CHIEF COMPLAINT Fall     SUBJECTIVE:  Ms. Fady Mortensen was resting in bed this morning. Reported tolerable Left hip pain. Denies SOB or chest pain at this time. CUMULATIVE HOSPITAL COURSE:   The patient is a 80 y.o. female with PMH of HTN, hypothyroidism, AF who presented to [facility] ED with complaints of fall. Stated she became dizzy, lightheadedness and LOC on day of admission. Reported landing on her right hip and head injury. Denied loss of bowel or bladder. Stated she was unable to ambulate after fall. Received IV fentanyl and IV Ativan due to increased pain in ED. Upon arrival to floor rapid response was called due to oxygen saturation in the 60s. Jv Moreira NP and Dr. Hien Cohn present and 0.2 mg Narcan given, patient arousable to speech, and answering questions appropriately. Continuous pulse oximetry and monitor on telemetry on medical floor at this time. Work-up in ER CXR no acute cardiopulmonary process, CT lumbar spine no acute fracture or subluxation, CT head no acute intracranial abnormality, CT pelvis multiple fractures of right sacrum, right inferior pubic rami and left pubic symphysis, no subluxation or dislocation, soft tissue swelling/blood products to the angulated right inferior pubic rami fracture, right hip prosthetic appears intact. Patient was admitted to hospital medicine for pelvic fracture with orthopedic surgery consultation. Orthopedic surgery recommended nonoperative treatment, pain control, DVT prophylaxis, weightbearing as tolerated with a walker.     12/18/2022   Ortho continues to follow, non-operative management. Case management assisting SNF placement in [facility]. Continue to monitor and trend daily labs. Potassium replacement for 3.5, will recheck in AM.     Review of Systems   Constitutional:  Negative for chills, diaphoresis, fatigue and fever. HENT:  Negative for congestion, ear pain, sinus pain, sore throat and trouble swallowing. Eyes:  Negative for visual disturbance. Respiratory:  Negative for cough, shortness of breath and wheezing. Cardiovascular:  Negative for chest pain, palpitations and leg swelling. Gastrointestinal:  Negative for abdominal distention, abdominal pain, blood in stool, constipation, diarrhea, nausea and vomiting. Endocrine: Negative for cold intolerance and heat intolerance. Genitourinary:  Negative for difficulty urinating, flank pain, frequency and urgency. Musculoskeletal:  Positive for arthralgias. Negative for myalgias. Left hip pain    Skin: Negative. Neurological:  Negative for dizziness, syncope, weakness, light-headedness, numbness and headaches. Hematological:  Does not bruise/bleed easily. Psychiatric/Behavioral:  Negative for agitation, confusion and dysphoric mood. Objective:   VITALS:  BP (!) 161/85   Pulse 81   Temp 97.7 °F (36.5 °C) (Temporal)   Resp 16   Ht 5' 2\" (1.575 m)   Wt 101 lb (45.8 kg)   SpO2 99%   BMI 18.47 kg/m²   24HR INTAKE/OUTPUT:    Intake/Output Summary (Last 24 hours) at 12/18/2022 1300  Last data filed at 12/18/2022 1242  Gross per 24 hour   Intake 1115 ml   Output 2050 ml   Net -935 ml         Physical Exam  Constitutional:       General: She is not in acute distress. Appearance: Normal appearance. She is not toxic-appearing or diaphoretic. HENT:      Head: Normocephalic and atraumatic. Right Ear: External ear normal.      Left Ear: External ear normal.      Nose: Nose normal. No congestion or rhinorrhea.       Mouth/Throat:      Mouth: Mucous membranes are moist. Pharynx: Oropharynx is clear. Eyes:      General: No scleral icterus. Extraocular Movements: Extraocular movements intact. Conjunctiva/sclera: Conjunctivae normal.   Cardiovascular:      Rate and Rhythm: Normal rate and regular rhythm. Pulses: Normal pulses. Heart sounds: Normal heart sounds. No murmur heard. No friction rub. No gallop. Pulmonary:      Effort: Pulmonary effort is normal. No respiratory distress. Breath sounds: Normal breath sounds. No wheezing, rhonchi or rales. Abdominal:      General: Abdomen is flat. Bowel sounds are normal. There is no distension. Palpations: Abdomen is soft. Tenderness: There is no abdominal tenderness. Musculoskeletal:         General: Tenderness present. No swelling. Normal range of motion. Cervical back: Normal range of motion and neck supple. Right lower leg: No edema. Left lower leg: No edema. Comments: Left hip pain   Skin:     General: Skin is warm and dry. Coloration: Skin is not jaundiced. Findings: No erythema, lesion or rash. Neurological:      General: No focal deficit present. Mental Status: She is alert and oriented to person, place, and time. Mental status is at baseline. Cranial Nerves: No cranial nerve deficit. Sensory: No sensory deficit. Motor: No weakness. Psychiatric:         Mood and Affect: Mood normal.         Behavior: Behavior normal.         Thought Content:  Thought content normal.         Judgment: Judgment normal.          Medications:      sodium chloride 100 mL/hr at 12/16/22 1712    sodium chloride        sodium chloride flush  5-40 mL IntraVENous 2 times per day    isosorbide mononitrate  30 mg Oral Daily    levothyroxine  50 mcg Oral Daily    lisinopril  40 mg Oral Daily    metoprolol succinate  25 mg Oral Daily    Vitamin D  1,000 Units Oral Daily    apixaban  2.5 mg Oral BID     potassium chloride **OR** potassium alternative oral replacement **OR** potassium chloride, sodium chloride flush, sodium chloride, ondansetron **OR** ondansetron, polyethylene glycol, acetaminophen **OR** acetaminophen  ADULT DIET; Regular     Lab and other Data:     Recent Labs     12/16/22  1333 12/17/22  0342 12/18/22  0200   WBC 12.1* 7.8 6.1   HGB 14.0 11.9* 11.8*    91* 91*       Recent Labs     12/16/22  1333 12/16/22  2229 12/17/22  0342 12/18/22  0200     --  144 142   K 3.7 3.7 4.0 3.5     --  107 104   CO2 31*  --  27 27   BUN 21  --  24* 25*   CREATININE 0.8  --  0.7 0.6   GLUCOSE 118*  --  104 87       Recent Labs     12/16/22  1333   AST 36*   ALT 28   BILITOT 0.9   ALKPHOS 75         INR:   Recent Labs     12/16/22  1333   INR 1.23*       UA:  Recent Labs     12/17/22  0606   COLORU YELLOW   PHUR 6.5   WBCUA 1   RBCUA 1   BACTERIA NEGATIVE*   CLARITYU CLEAR   SPECGRAV 1.020   LEUKOCYTESUR Negative   UROBILINOGEN 1.0   BILIRUBINUR Negative   BLOODU Negative   GLUCOSEU Negative       ABG:  Recent Labs     12/16/22 2229   PHART 7.430   OHN7GOR 48.0*   PO2ART 95.0   RZP2PKC 31.9*   BEART 6.5*   HGBAE 12.4   T6XBKOZK 95.6   CARBOXHGBART 2.1         RAD:     CT Head W/O Contrast  Result Date: 12/16/2022     1. No acute intracranial abnormality. 2. Mild chronic changes the brain parenchyma from small vessel ischemic. 3. Age-appropriate atrophic changes. Recommendation: Follow up as clinically indicated. All CT scans at this facility utilize dose modulation, iterative reconstruction, and/or weight based dosing when appropriate to reduce radiation dose to as low as reasonably achievable. Dictated and Electronically Signed by Alexandrea Matute MD, SA CERTIFIED at [id number] 05:55:38 PM               CT LUMBAR SPINE WO CONTRAST  Result Date: 12/16/2022    1. Multilevel lumbar spondylosis which is moderate to marked in severity. Multilevel mild to moderate neural foraminal stenosis. No severe bony canal stenosis. 2. No acute abnormality.  3. Retroperitoneal soft tissues are unremarkable. Recommendation: Follow up as clinically indicated. All CT scans at this facility utilize dose modulation, iterative reconstruction, and/or weight based dosing when appropriate to reduce radiation dose to as low as reasonably achievable. Dictated and Electronically Signed by Dwayne Carter MD, [address] [place] [address] at [id number] 05:46:42 PM               CT PELVIS WO CONTRAST Additional Contrast? None  Result Date: 12/16/2022    1. Multiple fractures of the right sacrum, right inferior pubic rami and left pubic symphysis. 2. No subluxation or dislocation. 3. Soft tissue swelling/blood products adjacent to the angulated right inferior pubic rami fracture. 4. Right hip prosthesis appears intact. Recommendation: Follow up as clinically indicated. All CT scans at this facility utilize dose modulation, iterative reconstruction, and/or weight based dosing when appropriate to reduce radiation dose to as low as reasonably achievable. Dictated and Electronically Signed by Dwayne Carter MD, MQSA CERTIFIED at [id number] 05:52:09 PM               XR CHEST PORTABLE  Result Date: 12/16/2022    No acute cardiopulmonary process identified. XR HIP 2-3 VW W PELVIS RIGHT  Result Date: 12/16/2022    The bones are diffusely demineralized. Question nondisplaced right inferior pubic ramus fracture and minimally displaced left superior pubic ramus fracture. Intramedullary sudhir is seen in the right femur. The hardware appears intact. A CT showed be performed for further characterization. Vascular calcifications. [facility] [place] [[id number]] Collected: 12/16/22 1333   Order Status: Completed Specimen: Nasopharyngeal Swab Updated: 12/16/22 1402    SARS-CoV-2, NAAT Not Detected       Assessment/Plan   Principal Problem:    Closed fracture of other parts of pelvis, initial encounter [facility])  Active Problems:    Fall    Syncope and collapse  Resolved Problems:    * No resolved hospital problems. *      Principal Problem:    Closed fracture of other parts of pelvis, initial encounter [facility])-    - Orthopedic surgery consulted    - Recommended nonoperative treatment, pain control, DVT prophylaxis, weightbearing as tolerated with a walker.   - Pain control   - PT/OT   - Case management assisting with DC planning, pending SNF or 8th floor rehab    Active Problems:    Fall/ Syncope and collapse-    - Recent ECHO (11/29/2022) indicated trace TR, mild AR, severe TR, preserved LVEF 43%, grade 1 diastolic dysfunction    - neuro checks    - Orthostatic vitals    - fall precautions    - monitor on telemetry    - UA unremarkable     Elevated Ddimer -    - CTA pulmonary to rule out PE- Atelectasis and pneumonia is at the lung bases with traction bronchiectasis. Paraseptal emphysema. Calcified granuloma right upper lobe. - Supplemental oxygen as needed         DVT Prophylaxis: Eliquis     Discharge planning: Case management assisting with DC planning      Further Orders per Clinical course/attending. Electronically signed by YOVANA Ken CNP on 12/18/2022 at 1:00 PM       EMR Dragon/Transcription disclaimer:   Much of this encounter note is an electronic transcription/translation of spoken language to printed text.  The electronic translation of spoken language may permit erroneous, or at times, nonsensical words or phrases to be inadvertently transcribed; although attempts have made to review the note for such errors, some may still exist.

## 2022-12-19 LAB
ANION GAP SERPL CALCULATED.3IONS-SCNC: 17 MMOL/L (ref 7–19)
BUN BLDV-MCNC: 14 MG/DL (ref 8–23)
CALCIUM SERPL-MCNC: 8.7 MG/DL (ref 8.8–10.2)
CHLORIDE BLD-SCNC: 101 MMOL/L (ref 98–111)
CO2: 22 MMOL/L (ref 22–29)
CREAT SERPL-MCNC: 0.5 MG/DL (ref 0.5–0.9)
EKG P AXIS: 86 DEGREES
EKG P-R INTERVAL: 168 MS
EKG Q-T INTERVAL: 360 MS
EKG QRS DURATION: 78 MS
EKG QTC CALCULATION (BAZETT): 416 MS
EKG T AXIS: 81 DEGREES
GFR SERPL CREATININE-BSD FRML MDRD: >60 ML/MIN/{1.73_M2}
GLUCOSE BLD-MCNC: 96 MG/DL (ref 74–109)
HCT VFR BLD CALC: 40.4 % (ref 37–47)
HEMOGLOBIN: 13.1 G/DL (ref 12–16)
MCH RBC QN AUTO: 30.4 PG (ref 27–31)
MCHC RBC AUTO-ENTMCNC: 32.4 G/DL (ref 33–37)
MCV RBC AUTO: 93.7 FL (ref 81–99)
PDW BLD-RTO: 13.1 % (ref 11.5–14.5)
PLATELET # BLD: 109 K/UL (ref 130–400)
PMV BLD AUTO: 11.6 FL (ref 9.4–12.3)
POTASSIUM REFLEX MAGNESIUM: 3.6 MMOL/L (ref 3.5–5)
RBC # BLD: 4.31 M/UL (ref 4.2–5.4)
SODIUM BLD-SCNC: 140 MMOL/L (ref 136–145)
WBC # BLD: 11.7 K/UL (ref 4.8–10.8)

## 2022-12-19 PROCEDURE — 80048 BASIC METABOLIC PNL TOTAL CA: CPT

## 2022-12-19 PROCEDURE — 97530 THERAPEUTIC ACTIVITIES: CPT

## 2022-12-19 PROCEDURE — 93010 ELECTROCARDIOGRAM REPORT: CPT | Performed by: INTERNAL MEDICINE

## 2022-12-19 PROCEDURE — 1210000000 HC MED SURG R&B

## 2022-12-19 PROCEDURE — 85027 COMPLETE CBC AUTOMATED: CPT

## 2022-12-19 PROCEDURE — 94761 N-INVAS EAR/PLS OXIMETRY MLT: CPT

## 2022-12-19 PROCEDURE — 36415 COLL VENOUS BLD VENIPUNCTURE: CPT

## 2022-12-19 PROCEDURE — 94760 N-INVAS EAR/PLS OXIMETRY 1: CPT

## 2022-12-19 PROCEDURE — 6370000000 HC RX 637 (ALT 250 FOR IP)

## 2022-12-19 PROCEDURE — 2580000003 HC RX 258: Performed by: EMERGENCY MEDICINE

## 2022-12-19 RX ORDER — LANOLIN ALCOHOL/MO/W.PET/CERES
1 CREAM (GRAM) TOPICAL DAILY
Status: DISCONTINUED | OUTPATIENT
Start: 2022-12-19 | End: 2022-12-20

## 2022-12-19 RX ADMIN — METOPROLOL SUCCINATE 25 MG: 25 TABLET, EXTENDED RELEASE ORAL at 09:12

## 2022-12-19 RX ADMIN — ISOSORBIDE MONONITRATE 30 MG: 30 TABLET, EXTENDED RELEASE ORAL at 09:12

## 2022-12-19 RX ADMIN — Medication 1000 UNITS: at 09:12

## 2022-12-19 RX ADMIN — APIXABAN 2.5 MG: 2.5 TABLET, FILM COATED ORAL at 19:58

## 2022-12-19 RX ADMIN — SODIUM CHLORIDE: 9 INJECTION, SOLUTION INTRAVENOUS at 08:52

## 2022-12-19 RX ADMIN — LEVOTHYROXINE SODIUM 50 MCG: 50 TABLET ORAL at 06:38

## 2022-12-19 RX ADMIN — APIXABAN 2.5 MG: 2.5 TABLET, FILM COATED ORAL at 09:12

## 2022-12-19 RX ADMIN — LISINOPRIL 40 MG: 20 TABLET ORAL at 09:12

## 2022-12-19 ASSESSMENT — ENCOUNTER SYMPTOMS
SINUS PAIN: 0
NAUSEA: 0
COUGH: 0
SHORTNESS OF BREATH: 0
WHEEZING: 0
VOMITING: 0
SORE THROAT: 0
TROUBLE SWALLOWING: 0
ABDOMINAL DISTENTION: 0
DIARRHEA: 0
BLOOD IN STOOL: 0
ABDOMINAL PAIN: 0
CONSTIPATION: 0

## 2022-12-19 NOTE — PROGRESS NOTES
[facility]      Progress Note    Patient:  Nurys Link  YOB: 1932  Date of Service: 12/19/2022  MRN: [id number]   Acct: [de-identified]   Primary Care Physician: Emil Perez  Advance Directive: Full Code  Admit Date: 12/16/2022       Hospital Day: 3    Portions of this note have been copied forward, however, updated to reflect the most current clinical status of this patient. CHIEF COMPLAINT Fall     SUBJECTIVE:  Ms. Genesis Dunn was resting in bed this morning. Reported tolerable Left hip pain. Denies SOB or chest pain at this time. CUMULATIVE HOSPITAL COURSE:   The patient is a 80 y.o. female with PMH of HTN, hypothyroidism, AF who presented to [facility] ED with complaints of fall. Stated she became dizzy, lightheadedness and LOC on day of admission. Reported landing on her right hip and head injury. Denied loss of bowel or bladder. Stated she was unable to ambulate after fall. Received IV fentanyl and IV Ativan due to increased pain in ED. Upon arrival to floor rapid response was called due to oxygen saturation in the 60s. Sandra Prasad NP and Dr. Roberta Ashford present and 0.2 mg Narcan given, patient arousable to speech, and answering questions appropriately. Continuous pulse oximetry and monitor on telemetry on medical floor at this time. Work-up in ER CXR no acute cardiopulmonary process, CT lumbar spine no acute fracture or subluxation, CT head no acute intracranial abnormality, CT pelvis multiple fractures of right sacrum, right inferior pubic rami and left pubic symphysis, no subluxation or dislocation, soft tissue swelling/blood products to the angulated right inferior pubic rami fracture, right hip prosthetic appears intact. Patient was admitted to hospital medicine for pelvic fracture with orthopedic surgery consultation. Orthopedic surgery recommended nonoperative treatment, pain control, DVT prophylaxis, weightbearing as tolerated with a walker.     12/18/2022   Ortho continues to follow, non-operative management. Case management assisting SNF placement in [facility]. Continue to monitor and trend daily labs. Potassium replacement for 3.5, will recheck in AM.    12/19/2022  Pending referral to [facility] SNF. Calcium 8.7, [place] initiated, will follow-up with PCP on discharge. PT continue to management treatment. Review of Systems   Constitutional:  Negative for chills, diaphoresis, fatigue and fever. HENT:  Negative for congestion, ear pain, sinus pain, sore throat and trouble swallowing. Eyes:  Negative for visual disturbance. Respiratory:  Negative for cough, shortness of breath and wheezing. Cardiovascular:  Negative for chest pain, palpitations and leg swelling. Gastrointestinal:  Negative for abdominal distention, abdominal pain, blood in stool, constipation, diarrhea, nausea and vomiting. Endocrine: Negative for cold intolerance and heat intolerance. Genitourinary:  Negative for difficulty urinating, flank pain, frequency and urgency. Musculoskeletal:  Positive for arthralgias. Negative for myalgias. Left hip pain    Skin: Negative. Neurological:  Negative for dizziness, syncope, weakness, light-headedness, numbness and headaches. Hematological:  Does not bruise/bleed easily. Psychiatric/Behavioral:  Negative for agitation, confusion and dysphoric mood. Objective:   VITALS:  /79   Pulse 95   Temp 98.6 °F (37 °C) (Temporal)   Resp 16   Ht 5' 2\" (1.575 m)   Wt 101 lb (45.8 kg)   SpO2 95%   BMI 18.47 kg/m²   24HR INTAKE/OUTPUT:    Intake/Output Summary (Last 24 hours) at 12/19/2022 1630  Last data filed at 12/19/2022 1014  Gross per 24 hour   Intake 1339 ml   Output 1750 ml   Net -411 ml       Physical Exam  Constitutional:       General: She is not in acute distress. Appearance: Normal appearance. She is not toxic-appearing or diaphoretic. HENT:      Head: Normocephalic and atraumatic.       Right Ear: External ear normal. Left Ear: External ear normal.      Nose: Nose normal. No congestion or rhinorrhea. Mouth/Throat:      Mouth: Mucous membranes are moist.      Pharynx: Oropharynx is clear. Eyes:      General: No scleral icterus. Extraocular Movements: Extraocular movements intact. Conjunctiva/sclera: Conjunctivae normal.   Cardiovascular:      Rate and Rhythm: Normal rate and regular rhythm. Pulses: Normal pulses. Heart sounds: Normal heart sounds. No murmur heard. No friction rub. No gallop. Pulmonary:      Effort: Pulmonary effort is normal. No respiratory distress. Breath sounds: Normal breath sounds. No wheezing, rhonchi or rales. Abdominal:      General: Abdomen is flat. Bowel sounds are normal. There is no distension. Palpations: Abdomen is soft. Tenderness: There is no abdominal tenderness. Musculoskeletal:         General: Tenderness present. No swelling. Normal range of motion. Cervical back: Normal range of motion and neck supple. Right lower leg: No edema. Left lower leg: No edema. Comments: Left hip pain   Skin:     General: Skin is warm and dry. Coloration: Skin is not jaundiced. Findings: No erythema, lesion or rash. Neurological:      General: No focal deficit present. Mental Status: She is alert and oriented to person, place, and time. Mental status is at baseline. Cranial Nerves: No cranial nerve deficit. Sensory: No sensory deficit. Motor: No weakness. Psychiatric:         Mood and Affect: Mood normal.         Behavior: Behavior normal.         Thought Content:  Thought content normal.         Judgment: Judgment normal.          Medications:      sodium chloride 100 mL/hr at 12/19/22 0852    sodium chloride        calcium-cholecalciferol  1 tablet Oral Daily    OLANZapine  2.5 mg IntraMUSCular Once    sodium chloride flush  5-40 mL IntraVENous 2 times per day    isosorbide mononitrate  30 mg Oral Daily levothyroxine  50 mcg Oral Daily    lisinopril  40 mg Oral Daily    metoprolol succinate  25 mg Oral Daily    Vitamin D  1,000 Units Oral Daily    apixaban  2.5 mg Oral BID     potassium chloride **OR** potassium alternative oral replacement **OR** potassium chloride, sodium chloride flush, sodium chloride, ondansetron **OR** ondansetron, polyethylene glycol, acetaminophen **OR** acetaminophen  ADULT DIET; Regular     Lab and other Data:     Recent Labs     12/17/22  0342 12/18/22  0200 12/19/22  0250   WBC 7.8 6.1 11.7*   HGB 11.9* 11.8* 13.1   PLT 91* 91* 109*     Recent Labs     12/17/22  0342 12/18/22  0200 12/19/22  0250    142 140   K 4.0 3.5 3.6    104 101   CO2 27 27 22   BUN 24* 25* 14   CREATININE 0.7 0.6 0.5   GLUCOSE 104 87 96     No results for input(s): AST, ALT, ALB, BILITOT, ALKPHOS in the last 72 hours. INR:   No results for input(s): INR in the last 72 hours. UA:  Recent Labs     12/17/22  0606   COLORU YELLOW   PHUR 6.5   WBCUA 1   RBCUA 1   BACTERIA NEGATIVE*   CLARITYU CLEAR   SPECGRAV 1.020   LEUKOCYTESUR Negative   UROBILINOGEN 1.0   BILIRUBINUR Negative   BLOODU Negative   GLUCOSEU Negative     ABG:  Recent Labs     12/16/22  2229   PHART 7.430   YMD2AGE 48.0*   PO2ART 95.0   IRC7ZOH 31.9*   BEART 6.5*   HGBAE 12.4   C3VZKRVN 95.6   CARBOXHGBART 2.1       RAD:     CT Head W/O Contrast  Result Date: 12/16/2022     1. No acute intracranial abnormality. 2. Mild chronic changes the brain parenchyma from small vessel ischemic. 3. Age-appropriate atrophic changes. Recommendation: Follow up as clinically indicated. All CT scans at this facility utilize dose modulation, iterative reconstruction, and/or weight based dosing when appropriate to reduce radiation dose to as low as reasonably achievable. Dictated and Electronically Signed by Kiley Powell MD, SA CERTIFIED at [id number] 05:55:38 PM               CT LUMBAR SPINE WO CONTRAST  Result Date: 12/16/2022    1.  Multilevel lumbar spondylosis which is moderate to marked in severity. Multilevel mild to moderate neural foraminal stenosis. No severe bony canal stenosis. 2. No acute abnormality. 3. Retroperitoneal soft tissues are unremarkable. Recommendation: Follow up as clinically indicated. All CT scans at this facility utilize dose modulation, iterative reconstruction, and/or weight based dosing when appropriate to reduce radiation dose to as low as reasonably achievable. Dictated and Electronically Signed by Dutch Watts MD, [address] [place] [address] at  05:46:42 PM               CT PELVIS WO CONTRAST Additional Contrast? None  Result Date: 12/16/2022    1. Multiple fractures of the right sacrum, right inferior pubic rami and left pubic symphysis. 2. No subluxation or dislocation. 3. Soft tissue swelling/blood products adjacent to the angulated right inferior pubic rami fracture. 4. Right hip prosthesis appears intact. Recommendation: Follow up as clinically indicated. All CT scans at this facility utilize dose modulation, iterative reconstruction, and/or weight based dosing when appropriate to reduce radiation dose to as low as reasonably achievable. Dictated and Electronically Signed by Dutch Watts MD, [facility] [place] CERTIFIED at [id number] 05:52:09 PM               XR CHEST PORTABLE  Result Date: 12/16/2022    No acute cardiopulmonary process identified. XR HIP 2-3 VW W PELVIS RIGHT  Result Date: 12/16/2022    The bones are diffusely demineralized. Question nondisplaced right inferior pubic ramus fracture and minimally displaced left superior pubic ramus fracture. Intramedullary sudhir is seen in the right femur. The hardware appears intact. A CT showed be performed for further characterization. Vascular calcifications.           Mohan Ramsey [[id number]] Collected: 12/16/22 1333   Order Status: Completed Specimen: Nasopharyngeal Swab Updated: 12/16/22 1402    SARS-CoV-2, NAAT Not Detected       Assessment/Plan   Principal Problem: Closed fracture of other parts of pelvis, initial encounter [facility])  Active Problems:    Fall    Syncope and collapse  Resolved Problems:    * No resolved hospital problems. *      Principal Problem:    Closed fracture of other parts of pelvis, initial encounter [facility])-    - Orthopedic surgery consulted    - Recommended nonoperative treatment, pain control, DVT prophylaxis, weightbearing as tolerated with a walker.   - Pain control   - PT/OT   - Case management assisting with DC planning, pending SNF or 8th floor rehab    Active Problems:    Fall/ Syncope and collapse-    - Recent ECHO (11/29/2022) indicated trace TR, mild AR, severe TR, preserved LVEF 74%, grade 1 diastolic dysfunction    - neuro checks    - Orthostatic vitals    - fall precautions    - monitor on telemetry    - UA unremarkable     Elevated Ddimer -    - CTA pulmonary to rule out PE- Atelectasis and pneumonia is at the lung bases with traction bronchiectasis. Paraseptal emphysema. Calcified granuloma right upper lobe. - Supplemental oxygen as needed         DVT Prophylaxis: Eliquis     Discharge planning: Case management assisting with DC planning      Further Orders per Clinical course/attending. Electronically signed by YOVANA Marie CNP on 12/19/2022 at 4:30 PM       EMR Dragon/Transcription disclaimer:   Much of this encounter note is an electronic transcription/translation of spoken language to printed text.  The electronic translation of spoken language may permit erroneous, or at times, nonsensical words or phrases to be inadvertently transcribed; although attempts have made to review the note for such errors, some may still exist.

## 2022-12-19 NOTE — CARE COORDINATION
Patient Information    Patient Name   Yuri Kennedy ([id number]) Legal Sex   Female    1932   Room Bed   0504 504-01       Patient Demographics    Address    [address]  [address] Phone   [phone] (Home)   [phone] [facility]       Jos [address] Number    SSN          Basic Information    Date Of Birth   1932 Legal Sex   Female Race   White (non-) Ethnic Group   Non- / Non  Preferred Language   English Preferred Written Language   English       Admission Information      Current Information    Attending Provider Admitting Provider Admission Type Admission Status   MD Nisha Lane MD Emergency Confirmed Admission          Admission Date/Time Discharge Date Hospital Service Auth/Cert Status     12:59 PM  [address] [facility] Unit Room/Bed    24 [facility] 5 SURG SERVICES [id number]                  Admission    Complaint          PCP and Center    Primary Care Provider   111 [facility]         Payor Information             PRIMARY INSURANCE   Payor: MEDICARE Plan: MEDICARE PART A AND B   Group Number:   Insurance Type: [address] Name: Chrissy Mclain : 1932   Subscriber ID: [id number]       Edda Reading. Rel. to Subscriber: Self       SECONDARY INSURANCE   Payor: BCBS Plan: ANTHEM MEDICARE SUPP   Group Number: [id number] Insurance Type: INDEMNITY   Subscriber Name: Chrissy Mclain : 1932   Subscriber ID: [id number]       Edda Reading.  Rel. to Subscriber: SELF                Documents on File     Status Date Received Description   Documents for the Patient   Photo ID Not Received     Insurance Card Not Received     ACP-Do Not Resuscitate      HIM VLAD Authorization Not Received     ACP-Advance Directive Not Received     ACP-Power of  Not Received     ACP-MOST      ACP-MOLST      ACP-POST      ACP-POLST      Patient Administration Not Received     Research Consent Not Received     Public Benefits Application Not Received     Public Benefits Support Not Received     Prescription Card Not Received     Hospital Recurring Universal Patient Consent Not Received     Insurance Card Received 22 [employer] Choice   Photo ID Received 22   Insurance Card Received 22 Medicare   Insurance Card Received 22 Medicare Health Insurance   HIM VLAD Authorization  10/04/22 DOS: [phone]   Patient Administration Received 22 REFERRAL TO CARDIO   Financial Responsibility      MyChart Adult Proxy Access Not Received     MyChart Child Proxy Access Not Received     ACO Consent for the Release of  Confidential Alcohol or Drug Treatment Information Not Received     ACO Declining to [address] [facility] Not Received     Physician Communication Release NPP Not Received     Consents Not Received     Insurance      CE Auth Form (Scanned)      Ambulatory Counselor Patient Consent Signed 22    Text Reminder Consent Patient Refused 22    Photo ID Received 22 [id number] pt aware    Insurance Card Received 22 bcbs   Insurance Card Received 22 medicare   External Cardiology Imaging   MUSE   External Cardiology Imaging   MUSE   External ED Note   V.S. - Intervention - MethOD   External ED Note   Phy Documentation - [facility]   External ED Note   Summary-MEDHOST   External Cardiology Imaging   [facility]   External Cardiology [address] Encounter   External ED Note   Method [facility]   External Radiology and Imaging   [id number]   External H&P Note   METHOD   External Progress Note   MethOD   External Other Order                                    External Other Order   Orders   External Medication   MethOD - Home Meds at Adm   External Other Order                                    External Other Order   Orders   External Medication   MethOD - Home Meds at Adm   External Other Order [id number]   External Other Order                                    External Other Order   Orders   External Medication   MethOD - Home Meds at [facility]   External Other Order                                    External Other Order   Orders   External Medication   MethOD - Home Meds at [facility]   External Other Order                                    External Other Order   Orders   External Medication   MethOD - Home Meds at [facility]   External Other Order   Transcribe Orders   External Other Order   rt hip xray   External Cardiology Imaging   MUSE   External After Visit Summary   AVS-Discharge Instructions (For Patient)   External After Visit Summary   AVS-Discharge Instructions (For Patient)   Archived Procedural Result   XR Hip 2-3 View Right   Archived Procedural Result   XR Shoulder 2+ Vw Right   External Cardiology Imaging   4/5/18   External Cardiology Imaging   4/5/18   External Power of       External Discharge Instructions   4/20/18   External Physical Therapy Note   4/20/18   External Physical Therapy Note   April LS   External Referral Request / Referral Report   Rolando RAMOS [facility]   External Prescription List   MEDICATIONS LIST 05/11/18   External Questionnaire   PATIENT MEDICAL HISTORY   External Physical Therapy Note   05/11/18 PT Order   Archived Procedural Result   XR Hip 2-3 View Right   Archived Procedural Result   XR Shoulder 2+ Vw Right   External After Visit Summary   AVS-Discharge Instructions (For Patient)   Archived Procedural Result   Transthoracic Echocardiogram Complete, wo Contrast, w Doppler   Archived Procedural Result   Insertion pacemaker pulse generator with existing leads   External After Visit Summary   Rx Savings Offers   External After Visit Summary   AVS-Discharge Instructions (For Patient)   External After Visit Summary   [facility] Patient Education   External After Visit Summary   Rx Savings Offers   External After Visit Summary   AVS-Discharge Instructions (For Patient) External Physical Therapy Note   4/20/18   External Other Order   US ORDER   External Transfer Summary   Hospital Encounter   External Cardiology [address] Encounter   External Cardiology Imaging   ECG 12 lead   External Other Order   Appointment   External H&P Note   6/15/20   External Cardiology Imaging   ECG 12 lead   External Cardiology [address] Encounter   External Discharge Instructions   Hospital Encounter   External After Visit Summary   [facility] Patient Education   External After Visit Summary   Rx Savings Offers   External After Visit Summary   AVS-Discharge Instructions (For Patient)   External Patient Reported [address] Encounter   External Procedure   PROVATION   External Procedure   [address]   External Cardiology [address] Encounter   External Patient Education and [address] Encounter   External Discharge Instructions   Hospital Encounter   External Patient Reported [address] Encounter   External Medication   Hospital Encounter   External Nurse Note   Hospital Encounter   External Procedure   PROVATION   External After Visit Summary   [facility] Patient Education   External After Visit Summary   AVS-Discharge Instructions (For Patient)   External After Visit Summary    AMB AVS MASTER   Archived Procedural Result   XR Knee 3 Vw Left   External After Visit Summary    AMB AVS MASTER   External Cardiology [address] Encounter   External Other Order   US ORDERS   External Referral Request / Referral Report   ortho referral Dr. Abbie Kaur   External Other Order   XR chest   External Other Order   XRAY ORDERS   HIM Release of Information Output  (Deleted) 10/04/22 Requested records   Documents for the Encounter   Medicare Important Message Received 12/16/22    Medicare Outpatient Observation Notice Not Received     [facility] - Medicare Second Copy Given to Pt      ABN Waiver Not Received     Administrative Not Received     Order Not Received Notice of Non Coverage Not Received     No Surprises Billing Act Not Received     Coordination of Benefit Not Received     Coverage COB Information Not Received     [facility] [place] Patient Consent Received 12/16/22        Medical Problems  Comment          Hospital Problem List  Date Reviewed: 12/16/2022     ICD-10-CM Priority Class Noted POA    Closed fracture of other parts of pelvis, initial encounter ([facility] [place] [address] S32.89XA Medium  12/16/2022 Yes   Fall W19. XXXA Medium  12/16/2022 Yes   Syncope and collapse R55 Medium  12/16/2022 Yes     Non-Hospital Problem List  Date Reviewed: 12/16/2022  None

## 2022-12-19 NOTE — CARE COORDINATION
Referral sent to [facility].  Awaiting acceptance & denial.    [facility]    p   f  Electronically signed by Chong Villalobos, RN, BSN on 12/19/2022 at 7:19 AM

## 2022-12-19 NOTE — CARE COORDINATION
Patient's family has requested a referral to [facility] in [place], [place]. Referral sent.  Awaiting acceptance/denial.    [facility]  Ph # [phone]  Fax # [phone]  Electronically signed by Raymon Gates RN, BSN on 12/19/2022 at 1:12 PM

## 2022-12-19 NOTE — PROGRESS NOTES
12/19/22 1100   Subjective   Subjective I am in [place] with my family. I moved here after my  passed. I just love the warm weather. Pain Assessment   Pain Assessment None - Denies Pain   Cognition   Overall Cognitive Status Exceptions   Memory Decreased recall of recent events;Decreased short term memory;Decreased long term memory   Orientation   Overall Orientation Status X   Orientation Level Disoriented to person;Disoriented to situation;Disoriented to place;Oriented to time   Vitals   O2 Device None (Room air)   Bed Mobility Training   Bed Mobility Training Yes   Supine to Sit Total assistance;Assist X2   Balance   Sitting Intact   Transfer Training   Transfer Training Yes   Sit to Stand Moderate assistance;Assist X2   Stand to Sit Minimum assistance;Assist X2   Bed to Chair Moderate assistance  (Patient held therapists arms and took small steps to chair)   Other Specialty Interventions   Other Treatments/Modalities Patient in chair ;personal alarm attached;warm blanket;all needs in reach.    Assessment   Activity Tolerance Patient tolerated treatment well   PT Plan of Care   Monday X         Electronically signed by Leighann Meehan PTA on 12/19/2022 at 11:46 AM

## 2022-12-19 NOTE — CARE COORDINATION
Pt also listed with   [facility] [place]    Electronically signed by Gigi Brink on 12/19/2022 at 12:58 PM

## 2022-12-19 NOTE — PROGRESS NOTES
12/19/22 1600   Subjective   Subjective Patient comfortable in chair, JAMAL Alvarez said NSG would TR.  BTB       Electronically signed by Gabbi Gross PTA on 12/19/2022 at 4:42 PM

## 2022-12-20 PROCEDURE — 94760 N-INVAS EAR/PLS OXIMETRY 1: CPT

## 2022-12-20 PROCEDURE — 94761 N-INVAS EAR/PLS OXIMETRY MLT: CPT

## 2022-12-20 PROCEDURE — 1210000000 HC MED SURG R&B

## 2022-12-20 PROCEDURE — 2580000003 HC RX 258: Performed by: EMERGENCY MEDICINE

## 2022-12-20 PROCEDURE — 6370000000 HC RX 637 (ALT 250 FOR IP)

## 2022-12-20 PROCEDURE — 97530 THERAPEUTIC ACTIVITIES: CPT

## 2022-12-20 RX ORDER — LANOLIN ALCOHOL/MO/W.PET/CERES
1 CREAM (GRAM) TOPICAL DAILY
Status: DISCONTINUED | OUTPATIENT
Start: 2022-12-20 | End: 2022-12-20 | Stop reason: SDUPTHER

## 2022-12-20 RX ADMIN — LEVOTHYROXINE SODIUM 50 MCG: 50 TABLET ORAL at 09:56

## 2022-12-20 RX ADMIN — APIXABAN 2.5 MG: 2.5 TABLET, FILM COATED ORAL at 19:38

## 2022-12-20 RX ADMIN — LISINOPRIL 40 MG: 20 TABLET ORAL at 09:56

## 2022-12-20 RX ADMIN — SODIUM CHLORIDE: 9 INJECTION, SOLUTION INTRAVENOUS at 14:49

## 2022-12-20 RX ADMIN — Medication 1000 UNITS: at 09:57

## 2022-12-20 RX ADMIN — Medication 1 TABLET: at 09:56

## 2022-12-20 RX ADMIN — ACETAMINOPHEN 650 MG: 325 TABLET, FILM COATED ORAL at 10:03

## 2022-12-20 RX ADMIN — ISOSORBIDE MONONITRATE 30 MG: 30 TABLET, EXTENDED RELEASE ORAL at 09:56

## 2022-12-20 RX ADMIN — METOPROLOL SUCCINATE 25 MG: 25 TABLET, EXTENDED RELEASE ORAL at 09:56

## 2022-12-20 RX ADMIN — ACETAMINOPHEN 650 MG: 325 TABLET, FILM COATED ORAL at 16:00

## 2022-12-20 RX ADMIN — APIXABAN 2.5 MG: 2.5 TABLET, FILM COATED ORAL at 09:56

## 2022-12-20 RX ADMIN — SODIUM CHLORIDE: 9 INJECTION, SOLUTION INTRAVENOUS at 04:37

## 2022-12-20 ASSESSMENT — ENCOUNTER SYMPTOMS
BLOOD IN STOOL: 0
SHORTNESS OF BREATH: 0
ABDOMINAL DISTENTION: 0
ABDOMINAL PAIN: 0
NAUSEA: 0
COUGH: 0
WHEEZING: 0
VOMITING: 0
SORE THROAT: 0
TROUBLE SWALLOWING: 0
SINUS PAIN: 0
CONSTIPATION: 0
DIARRHEA: 0

## 2022-12-20 ASSESSMENT — PAIN DESCRIPTION - DESCRIPTORS
DESCRIPTORS: ACHING
DESCRIPTORS: PRESSURE

## 2022-12-20 ASSESSMENT — PAIN SCALES - GENERAL
PAINLEVEL_OUTOF10: 5
PAINLEVEL_OUTOF10: 5

## 2022-12-20 ASSESSMENT — PAIN DESCRIPTION - LOCATION
LOCATION: BACK
LOCATION: HIP;HEAD

## 2022-12-20 ASSESSMENT — PAIN DESCRIPTION - ORIENTATION
ORIENTATION: RIGHT
ORIENTATION: LOWER

## 2022-12-20 ASSESSMENT — PAIN - FUNCTIONAL ASSESSMENT
PAIN_FUNCTIONAL_ASSESSMENT: ACTIVITIES ARE NOT PREVENTED
PAIN_FUNCTIONAL_ASSESSMENT: PREVENTS OR INTERFERES SOME ACTIVE ACTIVITIES AND ADLS

## 2022-12-20 NOTE — CARE COORDINATION
I called patient's sister, Marjorie Mendoza. I explained to her that [facility] in [place], [place] denied and [facility] in [place], [place] has offered a bed. She states understanding and declined bed offer from [facility]. She has requested a referral to be sent [place] [facility] [place]. Referral sent.  Awaiting acceptance/denial.    [facility]  Phone: [phone]  Fax: [phone]  Electronically signed by Fernie Ny RN, BSN on 12/20/2022 at 12:06 PM

## 2022-12-20 NOTE — PROGRESS NOTES
12/20/22 1003   Subjective   Subjective Yes I would like to sit up in the chair. Pain Assessment   Pain Level 5   Patient's Stated Pain Goal 0 - No pain   Pain Location Back   Pain Orientation Lower   Pain Descriptors Pressure   Functional Pain Assessment Activities are not prevented   Non-Pharmaceutical Pain Intervention(s) Repositioned; Rest   Orientation   Overall Orientation Status X   Orientation Level Oriented to person;Disoriented to place;Oriented to time   Vitals   O2 Device None (Room air)   Bed Mobility Training   Bed Mobility Training Yes   Supine to Sit Total assistance;Assist X2   Balance   Sitting Intact   Standing With support   Transfer Training   Transfer Training Yes   Sit to Stand Moderate assistance;Assist X2   Stand to Sit Minimum assistance;Assist X2   Bed to Chair Moderate assistance;Assist X2   Gait Training   Gait Training Yes   Gait   Overall Level of Assistance Moderate assistance;Assist X2   Speed/Zahraa Shuffled; Slow   Gait Abnormalities Shuffling gait   Distance (ft) 4 Feet  (to chair)   Assistive Device   ([address])   Other Specialty Interventions   Other Treatments/Modalities Patient in chair all needs in reach;personal alarm attached.    Assessment   Activity Tolerance Patient tolerated treatment well   PT Plan of Care   Tuesday X         Electronically signed by Bradly Mckenna PTA on 12/20/2022 at 10:17 AM

## 2022-12-20 NOTE — PROGRESS NOTES
Physician Progress Note      Natalie Carter  CSN #:                  [id number]  :                       1932  ADMIT DATE:       2022 12:59 PM  [address] [place] DATE:  RESPONDING  PROVIDER #:        Bernardo Estrada MD          QUERY TEXT:    Patient admitted with a fractured pelvis, noted to have a hx of atrial   fibrillation and is maintained on Eliquis. If possible, please document in   progress notes and discharge summary if you are evaluating and/or treating any   of the following: The medical record reflects the following:  Risk Factors: 80year old female with a hx of hypertension  Clinical Indicators: afib  Treatment: Eliquis    Thank you,  Kae Roa, [facility]  [phone]  Options provided:  -- Secondary hypercoagulable state in a patient with atrial fibrillation  -- Other - I will add my own diagnosis  -- Disagree - Not applicable / Not valid  -- Disagree - Clinically unable to determine / Unknown  -- Refer to Clinical Documentation Reviewer    PROVIDER RESPONSE TEXT:    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     Query created by: Kit Wang on 2022 2:28 PM      Electronically signed by:  Bernardo Estrada MD 2022 3:13 PM

## 2022-12-20 NOTE — PROGRESS NOTES
Occupational Therapy  Pt in bed and asks for a pain pill upon entering the room. Pt states \"I am in a lot of pain and can't move\". Nurse Amie Brennan made aware. Will continue to follow.  Electronically signed by JOSEFA Balderas on 12/20/2022 at 4:59 PM

## 2022-12-20 NOTE — CARE COORDINATION
Patient Information    Patient Name   Sherie Anderson ([id number]) Legal Sex   Female    1932   Room Bed   0504 504-01       Basic Information    Date Of Birth   1932 Legal Sex   Female Race   White (non-) Ethnic Group   Non- / Non  Preferred Language   English Preferred Written Language   English      Status   No [002]       Patient Demographics    Address    [address] [address] Phone   [phone] (Home)   [phone] (Mobile)       PCP and Center    Primary Care Provider   [address]   [facility]         Patient Contacts    Name Relation Home Work Rafat Brother/Sister [phone]     [address] [phone]   Gustabo Rocha Daughter-in-Law   [phone]   Shawn Rayol Niece/Nephew [phone]         Documents on File     Status Date Received Description   Documents for the Patient   Photo ID Not Received     Insurance Card Not Received     ACP-Do Not Resuscitate      HIM VLAD Authorization Not Received     ACP-Advance Directive Not Received     ACP-Power of  Not Received     ACP-MOST      ACP-MOLST      ACP-POST      ACP-POLST      Patient Administration Not Received     Research Consent Not Received     Public Benefits Application Not Received     Public Benefits Support Not Received     Prescription Card Not Received     [facility] Universal Patient Consent Not Received     Insurance Card Received 22 [facility] Choice   Photo ID Received 22   Insurance Card Received 22 Medicare   Insurance Card Received 22 [address] Authorization  10/04/22 DOS: [phone]   Patient Administration Received 22 REFERRAL TO [address] Adult Proxy Access Not Received     [facility] Not Received     ACO Consent for the Release of  Confidential Alcohol or Drug Treatment Information Not Received     ACO Declining to [address] [facility] Not Received     Physician Communication Release NPP Not Received     Consents Not Received     Insurance      CE Auth Form (Scanned)      Ambulatory [employer] Patient Consent Signed 22    Text Reminder Consent Patient Refused 22    Photo ID Received 22 [id number] pt aware    Insurance Card Received 22 bcbs   Insurance Card Received 22 medicare   External Cardiology Imaging   MUSE   External Cardiology Imaging   MUSE   External ED Note   V.S. - Intervention - MethOD   External ED Note   Phy Documentation - 85 [facility]   External ED Note   Summary-MEDHOST   External Cardiology Imaging   [facility]   External Cardiology [address] Encounter   External ED Note   Method [facility]   External Radiology and Imaging   [id number]   External H&P Note   METHOD   External Progress Note   MethOD   External Other Order                                    External Other Order   Orders   External Medication   MethOD - Home Meds at [facility]   External Other Order                                    External Other Order   Orders   External Medication   MethOD - Home Meds at [facility]   External Other Order   [id number]   External Other Order                                    External Other Order   Orders   External Medication   MethOD - Home Meds at [facility]   External Other Order                                    External Other Order   Orders   External Medication   MethOD - Home Meds at [facility]   External Other Order                                    External Other Order   Orders   External Medication   MethOD - Home Meds at [facility]   External Other Order   Transcribe Orders   External Other Order   rt hip xray   External Cardiology Imaging   MUSE   External After Visit Summary   AVS-Discharge Instructions (For Patient)   External After Visit Summary   AVS-Discharge Instructions (For Patient)   Archived Procedural Result   XR Hip 2-3 View Right   Archived Procedural Result   XR Shoulder 2+ Vw Right   External Cardiology Imaging   4/5/18   External Cardiology Imaging   4/5/18   External Power of       External Discharge Instructions   4/20/18   External Physical Therapy Note   4/20/18   External Physical Therapy Note   April LS   External Referral Request / Referral Report   [facility]   External Prescription List   MEDICATIONS LIST 05/11/18   External Questionnaire   PATIENT MEDICAL HISTORY   External Physical Therapy Note   05/11/18 PT Order   Archived Procedural Result   XR Hip 2-3 View Right   Archived Procedural Result   XR Shoulder 2+ Vw Right   External After Visit Summary   AVS-Discharge Instructions (For Patient)   Archived Procedural Result   Transthoracic Echocardiogram Complete, wo Contrast, w Doppler   Archived Procedural Result   Insertion pacemaker pulse generator with existing leads   External After Visit Summary   Rx Savings Offers   External After Visit Summary   AVS-Discharge Instructions (For Patient)   External After Visit Summary   [facility] Patient Education   External After Visit Summary   Rx Savings Offers   External After Visit Summary   AVS-Discharge Instructions (For Patient)   External Physical Therapy Note   4/20/18   External Other Order   US ORDER   External Transfer Summary   Hospital Encounter   External Cardiology [address] Encounter   External Cardiology Imaging   ECG 12 lead   External Other Order   Appointment   External H&P Note   6/15/20   External Cardiology Imaging   ECG 12 lead   External Cardiology [address] Encounter   External Discharge Instructions   Hospital Encounter   External After Visit Summary   [facility]   External After Visit Summary   Rx Savings Offers   External After Visit Summary   AVS-Discharge Instructions (For Patient)   External Patient Reported [address] Encounter   External Procedure   PROVATION   External Procedure   PROVATION   External Cardiology Imaging   Hospital Encounter   External Patient Education and Instructions   Hospital Encounter   External Discharge Instructions   Hospital Encounter   External Patient Reported [address] Encounter   External Medication   Hospital Encounter   External Nurse Note   Hospital Encounter   External Procedure   PROVATION   External After Visit Summary   [facility] Patient Education   External After Visit Summary   AVS-Discharge Instructions (For Patient)   External After Visit Summary    AMB AVS MASTER   Archived Procedural Result   XR Knee 3 Vw Left   External After Visit Summary    AMB AVS MASTER   External Cardiology [address] Encounter   External Other Order   US ORDERS   External Referral Request / Referral Report   ortho referral Dr. Jazmyne Means   External Other Order   XR chest   External Other Order   XRAY ORDERS   HIM Release of Information Output  (Deleted) 10/04/22 Requested records   Documents for the Encounter   Medicare Important Message Received 12/16/22    Medicare Outpatient Observation Notice Not Received     IMM - Medicare Second Copy Given to Pt      ABN Waiver Not Received     Administrative Not Received     Order Not Received     Notice of Non Coverage Not Received     No Surprises Billing Act Not Received     Coordination of Benefit Not Received     Coverage COB Information Not Received     [facility] Patient Consent Received 12/16/22    Stress ECG Waveforms Received 12/19/22 EKG 12-LEAD on the ekg interface         Cost Receipt     Jump to Cost Receipt         Admission Information      Current Information    Attending Provider Admitting Provider Admission Type Admission Status   MD Jef Petit MD Emergency Confirmed Admission          Admission Date/Time Discharge Date Hospital Service Auth/Cert Status   14/61/03  12:59 [address] [facility] [address] Unit Room/Bed    24 [facility] 5 SURG SERVICES [id number]/007-25 Admission    Complaint          Hospital Account    Name Acct ID Class Status Primary Coverage   Ema Addison [id number] Inpatient Open MEDICARE - MEDICARE PART A AND B            Guarantor Account (for Hospital Account [de-identified])    Name Relation to Pt Service Area Active? Acct Type   Carolynjesica Joneselan [facility] Yes Personal/Family   Address Phone     [address] [facility] [phone](Y)              Coverage Information (for Hospital Account [de-identified])      1. [address] PART A AND B    F/O Payor/Plan Precert #   MEDICARE/MEDICARE PART A AND B    Subscriber Subscriber #   Ema Joneselan [id number]   Address Phone   [address] [address] [place] [address] [phone]     2. BCBS/ANTHEM MEDICARE SUPP    F/O Payor/Plan Precert #   BCBS/ANTHEM MEDICARE SUPP    Subscriber Subscriber #   Ema Addison [id number]   Address Phone   [address]   [address] [phone]              Medical Problems  Comment          Hospital Problem List  Date Reviewed: 12/16/2022     ICD-10-CM Priority Class Noted POA    Closed fracture of other parts of pelvis, initial encounter ([facility] [address] S32.89XA Medium  12/16/2022 Yes   Fall W19. XXXA Medium  12/16/2022 Yes   Syncope and collapse R55 Medium  12/16/2022 Yes     Non-Hospital Problem List  Date Reviewed: 12/16/2022  None

## 2022-12-20 NOTE — PROGRESS NOTES
Comprehensive Nutrition Assessment    Type and Reason for Visit:  Reassess    Nutrition Recommendations/Plan:   ONS TID     Nutrition Assessment:    Pt's nutritional intake has remained low d/t pain and confusion. Pt is underwt, AEB BMI 18.47. Will start ONS TID to help meet nutritional needs and promote oral intake. Current Nutrition Intake & Therapies:    Average Meal Intake: 1-25%  ADULT DIET; Regular    Anthropometric Measures:  Height: 5' 2\" (157.5 cm)  Ideal Body Weight (IBW): 110 lbs (50 kg)    Admission Body Weight: 101 lb (45.8 kg)  Current Body Weight: 101 lb (45.8 kg), 91.8 % IBW.     Current BMI (kg/m2): 18.5  Usual Body Weight: 100 lb (45.4 kg) (9/2022)  % Weight Change (Calculated): 1  BMI Categories: Underweight (BMI less than 22) age over 72    Estimated Daily Nutrient Needs:  Energy Requirements Based On: Kcal/kg  Weight Used for Energy Requirements: Current  Energy (kcal/day): 6298-4374 kcals/day (30-35)  Weight Used for Protein Requirements: Current  Protein (g/day): 69 g/protein/day  Method Used for Fluid Requirements: 1 ml/kcal  Fluid (ml/day): 0115-1176 mL/day    Nutrition Diagnosis:   Inadequate oral intake related to acute injury/trauma as evidenced by intake 0-25%    Nutrition Interventions:   Food and/or Nutrient Delivery: Continue Current Diet, Start Oral Nutrition Supplement  Coordination of Nutrition Care: Continue to monitor while inpatient    Goals:  Previous Goal Met: No Progress toward Goal(s)  Goals: PO intake 50% or greater    Nutrition Monitoring and Evaluation:   Food/Nutrient Intake Outcomes: Food and Nutrient Intake, Supplement Intake  Physical Signs/Symptoms Outcomes: Biochemical Data, Weight    Ji Claros, MS, RD, LD  Contact: [phone]

## 2022-12-20 NOTE — PROGRESS NOTES
[facility]      Progress Note    Patient:  Aly Meng  YOB: 1932  Date of Service: 12/20/2022  MRN: [id number]   Acct: [de-identified]   Primary Care Physician: Katelynn Her  Advance Directive: Full Code  Admit Date: 12/16/2022       Hospital Day: 4    Portions of this note have been copied forward, however, updated to reflect the most current clinical status of this patient. CHIEF COMPLAINT Fall     SUBJECTIVE:  Ms. Radha Keller was resting in bed this morning. Reported tolerable Left hip pain. Denies SOB or chest pain at this time. CUMULATIVE HOSPITAL COURSE:   The patient is a 80 y.o. female with PMH of HTN, hypothyroidism, AF who presented to [facility] ED with complaints of fall. Stated she became dizzy, lightheadedness and LOC on day of admission. Reported landing on her right hip and head injury. Denied loss of bowel or bladder. Stated she was unable to ambulate after fall. Received IV fentanyl and IV Ativan due to increased pain in ED. Upon arrival to floor rapid response was called due to oxygen saturation in the 60s. Pranav Burdick NP and Dr. Sanjiv Hitchcock present and 0.2 mg Narcan given, patient arousable to speech, and answering questions appropriately. Continuous pulse oximetry and monitor on telemetry on medical floor at this time. Work-up in ER CXR no acute cardiopulmonary process, CT lumbar spine no acute fracture or subluxation, CT head no acute intracranial abnormality, CT pelvis multiple fractures of right sacrum, right inferior pubic rami and left pubic symphysis, no subluxation or dislocation, soft tissue swelling/blood products to the angulated right inferior pubic rami fracture, right hip prosthetic appears intact. Patient was admitted to hospital medicine for pelvic fracture with orthopedic surgery consultation. Orthopedic surgery recommended nonoperative treatment, pain control, DVT prophylaxis, weightbearing as tolerated with a walker.     12/18/2022   Ortho continues to follow, non-operative management. Case management assisting SNF placement in [facility]. Continue to monitor and trend daily labs. Potassium replacement for 3.5, will recheck in AM.    12/19/2022  Pending referral to [facility] SNF. Calcium 8.7, Oscal initiated, will follow-up with PCP on discharge. PT continue to management treatment. 12/20/2022   No change in patient status, continues to wait for SNF placement. Speech evaluation for swallowing study. Will change to Dysphagia, soft and bite size, no hard foods. Review of Systems   Constitutional:  Negative for chills, diaphoresis, fatigue and fever. HENT:  Negative for congestion, ear pain, sinus pain, sore throat and trouble swallowing. Eyes:  Negative for visual disturbance. Respiratory:  Negative for cough, shortness of breath and wheezing. Cardiovascular:  Negative for chest pain, palpitations and leg swelling. Gastrointestinal:  Negative for abdominal distention, abdominal pain, blood in stool, constipation, diarrhea, nausea and vomiting. Endocrine: Negative for cold intolerance and heat intolerance. Genitourinary:  Negative for difficulty urinating, flank pain, frequency and urgency. Musculoskeletal:  Positive for arthralgias. Negative for myalgias. Left hip pain    Skin: Negative. Neurological:  Negative for dizziness, syncope, weakness, light-headedness, numbness and headaches. Hematological:  Does not bruise/bleed easily. Psychiatric/Behavioral:  Negative for agitation, confusion and dysphoric mood.        Objective:   VITALS:  BP (!) 147/94   Pulse 82   Temp 98.4 °F (36.9 °C) (Temporal)   Resp 16   Ht 5' 2\" (1.575 m)   Wt 101 lb (45.8 kg)   SpO2 95%   BMI 18.47 kg/m²   24HR INTAKE/OUTPUT:    Intake/Output Summary (Last 24 hours) at 12/20/2022 0853  Last data filed at 12/20/2022 0610  Gross per 24 hour   Intake 1849.02 ml   Output 800 ml   Net 1049.02 ml         Physical Exam  Constitutional: General: She is not in acute distress. Appearance: Normal appearance. She is not toxic-appearing or diaphoretic. HENT:      Head: Normocephalic and atraumatic. Right Ear: External ear normal.      Left Ear: External ear normal.      Nose: Nose normal. No congestion or rhinorrhea. Mouth/Throat:      Mouth: Mucous membranes are moist.      Pharynx: Oropharynx is clear. Eyes:      General: No scleral icterus. Extraocular Movements: Extraocular movements intact. Conjunctiva/sclera: Conjunctivae normal.   Cardiovascular:      Rate and Rhythm: Normal rate and regular rhythm. Pulses: Normal pulses. Heart sounds: Normal heart sounds. No murmur heard. No friction rub. No gallop. Pulmonary:      Effort: Pulmonary effort is normal. No respiratory distress. Breath sounds: Normal breath sounds. No wheezing, rhonchi or rales. Abdominal:      General: Abdomen is flat. Bowel sounds are normal. There is no distension. Palpations: Abdomen is soft. Tenderness: There is no abdominal tenderness. Musculoskeletal:         General: Tenderness present. No swelling. Normal range of motion. Cervical back: Normal range of motion and neck supple. Right lower leg: No edema. Left lower leg: No edema. Comments: Left hip pain   Skin:     General: Skin is warm and dry. Coloration: Skin is not jaundiced. Findings: No erythema, lesion or rash. Neurological:      General: No focal deficit present. Mental Status: She is alert and oriented to person, place, and time. Mental status is at baseline. Cranial Nerves: No cranial nerve deficit. Sensory: No sensory deficit. Motor: No weakness. Psychiatric:         Mood and Affect: Mood normal.         Behavior: Behavior normal.         Thought Content:  Thought content normal.         Judgment: Judgment normal.          Medications:      sodium chloride 100 mL/hr at 12/20/22 0437    sodium chloride calcium-cholecalciferol  1 tablet Oral Daily    OLANZapine  2.5 mg IntraMUSCular Once    sodium chloride flush  5-40 mL IntraVENous 2 times per day    isosorbide mononitrate  30 mg Oral Daily    levothyroxine  50 mcg Oral Daily    lisinopril  40 mg Oral Daily    metoprolol succinate  25 mg Oral Daily    Vitamin D  1,000 Units Oral Daily    apixaban  2.5 mg Oral BID     potassium chloride **OR** potassium alternative oral replacement **OR** potassium chloride, sodium chloride flush, sodium chloride, ondansetron **OR** ondansetron, polyethylene glycol, acetaminophen **OR** acetaminophen  ADULT DIET; Regular     Lab and other Data:     Recent Labs     12/18/22  0200 12/19/22  0250   WBC 6.1 11.7*   HGB 11.8* 13.1   PLT 91* 109*       Recent Labs     12/18/22  0200 12/19/22  0250    140   K 3.5 3.6    101   CO2 27 22   BUN 25* 14   CREATININE 0.6 0.5   GLUCOSE 87 96       No results for input(s): AST, ALT, ALB, BILITOT, ALKPHOS in the last 72 hours. INR:   No results for input(s): INR in the last 72 hours. UA:  No results for input(s): NITRITE, COLORU, PHUR, LABCAST, WBCUA, RBCUA, MUCUS, TRICHOMONAS, YEAST, BACTERIA, CLARITYU, SPECGRAV, LEUKOCYTESUR, UROBILINOGEN, BILIRUBINUR, BLOODU, GLUCOSEU, AMORPHOUS in the last 72 hours. Invalid input(s): KETONESU    ABG:  No results for input(s): PHART, IXH7PRX, PO2ART, WDW8XCG, BEART, HGBAE, E4RYICSX, CARBOXHGBART in the last 72 hours. RAD:     CT Head W/O Contrast  Result Date: 12/16/2022     1. No acute intracranial abnormality. 2. Mild chronic changes the brain parenchyma from small vessel ischemic. 3. Age-appropriate atrophic changes. Recommendation: Follow up as clinically indicated. All CT scans at this facility utilize dose modulation, iterative reconstruction, and/or weight based dosing when appropriate to reduce radiation dose to as low as reasonably achievable.  Dictated and Electronically Signed by Mireille Guerin MD, SA CERTIFIED at 16-Dec-2022 05:55:38 PM               CT LUMBAR SPINE WO CONTRAST  Result Date: 12/16/2022    1. Multilevel lumbar spondylosis which is moderate to marked in severity. Multilevel mild to moderate neural foraminal stenosis. No severe bony canal stenosis. 2. No acute abnormality. 3. Retroperitoneal soft tissues are unremarkable. Recommendation: Follow up as clinically indicated. All CT scans at this facility utilize dose modulation, iterative reconstruction, and/or weight based dosing when appropriate to reduce radiation dose to as low as reasonably achievable. Dictated and Electronically Signed by Dorota Costa MD, [address] [place] [address] at [id number] 05:46:42 PM               CT PELVIS WO CONTRAST Additional Contrast? None  Result Date: 12/16/2022    1. Multiple fractures of the right sacrum, right inferior pubic rami and left pubic symphysis. 2. No subluxation or dislocation. 3. Soft tissue swelling/blood products adjacent to the angulated right inferior pubic rami fracture. 4. Right hip prosthesis appears intact. Recommendation: Follow up as clinically indicated. All CT scans at this facility utilize dose modulation, iterative reconstruction, and/or weight based dosing when appropriate to reduce radiation dose to as low as reasonably achievable. Dictated and Electronically Signed by Dorota Costa MD, [facility] CERTIFIED at [id number] 05:52:09 PM               XR CHEST PORTABLE  Result Date: 12/16/2022    No acute cardiopulmonary process identified. XR HIP 2-3 VW W PELVIS RIGHT  Result Date: 12/16/2022    The bones are diffusely demineralized. Question nondisplaced right inferior pubic ramus fracture and minimally displaced left superior pubic ramus fracture. Intramedullary sudhir is seen in the right femur. The hardware appears intact. A CT showed be performed for further characterization. Vascular calcifications.           Mohan Byrd [[id number]] Collected: 12/16/22 1333   Order Status: Completed Specimen: Nasopharyngeal Swab Updated: 12/16/22 1402    SARS-CoV-2, NAAT Not Detected       Assessment/Plan   Principal Problem:    Closed fracture of other parts of pelvis, initial encounter [facility])  Active Problems:    Fall    Syncope and collapse  Resolved Problems:    * No resolved hospital problems. *      Principal Problem:    Closed fracture of other parts of pelvis, initial encounter [facility])-    - Orthopedic surgery consulted    - Recommended nonoperative treatment, pain control, DVT prophylaxis, weightbearing as tolerated with a walker.   - Pain control   - PT/OT   - Case management assisting with DC planning, pending SNF placement    Active Problems:    Fall/ Syncope and collapse-    - Recent ECHO (11/29/2022) indicated trace TR, mild AR, severe TR, preserved LVEF 23%, grade 1 diastolic dysfunction    - neuro checks    - Orthostatic vitals    - fall precautions    - monitor on telemetry    - UA unremarkable     Elevated Ddimer -    - CTA pulmonary to rule out PE- Atelectasis and pneumonia is at the lung bases with traction bronchiectasis. Paraseptal emphysema. Calcified granuloma right upper lobe. - Supplemental oxygen as needed         DVT Prophylaxis: Eliquis     Discharge planning: Case management assisting with DC planning      Further Orders per Clinical course/attending. Electronically signed by YOVANA Mejia CNP on 12/20/2022 at 8:53 AM       EMR Dragon/Transcription disclaimer:   Much of this encounter note is an electronic transcription/translation of spoken language to printed text.  The electronic translation of spoken language may permit erroneous, or at times, nonsensical words or phrases to be inadvertently transcribed; although attempts have made to review the note for such errors, some may still exist.

## 2022-12-20 NOTE — CARE COORDINATION
I spoke with patient's grandson and his spouse by phone. They would like a referral to be sent to [place] [id number]. [facility]. Referral sent.  Awaiting acceptance/denial.    [address] [place] [address] # [phone]  Fax # [phone]  Electronically signed by Maya Solomon RN, BSN on 12/20/2022 at 2:34 PM

## 2022-12-21 LAB
ANION GAP SERPL CALCULATED.3IONS-SCNC: 9 MMOL/L (ref 7–19)
BASOPHILS ABSOLUTE: 0.1 K/UL (ref 0–0.2)
BASOPHILS RELATIVE PERCENT: 0.8 % (ref 0–1)
BUN BLDV-MCNC: 27 MG/DL (ref 8–23)
CALCIUM SERPL-MCNC: 8.4 MG/DL (ref 8.8–10.2)
CHLORIDE BLD-SCNC: 112 MMOL/L (ref 98–111)
CO2: 22 MMOL/L (ref 22–29)
CREAT SERPL-MCNC: 0.5 MG/DL (ref 0.5–0.9)
EOSINOPHILS ABSOLUTE: 0.3 K/UL (ref 0–0.6)
EOSINOPHILS RELATIVE PERCENT: 5 % (ref 0–5)
GFR SERPL CREATININE-BSD FRML MDRD: >60 ML/MIN/{1.73_M2}
GLUCOSE BLD-MCNC: 116 MG/DL (ref 74–109)
HCT VFR BLD CALC: 37 % (ref 37–47)
HEMOGLOBIN: 11.6 G/DL (ref 12–16)
IMMATURE GRANULOCYTES #: 0.1 K/UL
LYMPHOCYTES ABSOLUTE: 0.9 K/UL (ref 1.1–4.5)
LYMPHOCYTES RELATIVE PERCENT: 14.7 % (ref 20–40)
MAGNESIUM: 1.9 MG/DL (ref 1.7–2.3)
MCH RBC QN AUTO: 30.3 PG (ref 27–31)
MCHC RBC AUTO-ENTMCNC: 31.4 G/DL (ref 33–37)
MCV RBC AUTO: 96.6 FL (ref 81–99)
MONOCYTES ABSOLUTE: 0.8 K/UL (ref 0–0.9)
MONOCYTES RELATIVE PERCENT: 12.7 % (ref 0–10)
NEUTROPHILS ABSOLUTE: 4 K/UL (ref 1.5–7.5)
NEUTROPHILS RELATIVE PERCENT: 65.6 % (ref 50–65)
PDW BLD-RTO: 13.8 % (ref 11.5–14.5)
PLATELET # BLD: 134 K/UL (ref 130–400)
PMV BLD AUTO: 10.3 FL (ref 9.4–12.3)
POTASSIUM REFLEX MAGNESIUM: 3.5 MMOL/L (ref 3.5–5)
RBC # BLD: 3.83 M/UL (ref 4.2–5.4)
SODIUM BLD-SCNC: 143 MMOL/L (ref 136–145)
WBC # BLD: 6.1 K/UL (ref 4.8–10.8)

## 2022-12-21 PROCEDURE — 6370000000 HC RX 637 (ALT 250 FOR IP): Performed by: HOSPITALIST

## 2022-12-21 PROCEDURE — 36415 COLL VENOUS BLD VENIPUNCTURE: CPT

## 2022-12-21 PROCEDURE — 94760 N-INVAS EAR/PLS OXIMETRY 1: CPT

## 2022-12-21 PROCEDURE — 97530 THERAPEUTIC ACTIVITIES: CPT

## 2022-12-21 PROCEDURE — 83735 ASSAY OF MAGNESIUM: CPT

## 2022-12-21 PROCEDURE — 92522 EVALUATE SPEECH PRODUCTION: CPT

## 2022-12-21 PROCEDURE — 2580000003 HC RX 258: Performed by: EMERGENCY MEDICINE

## 2022-12-21 PROCEDURE — 85025 COMPLETE CBC W/AUTO DIFF WBC: CPT

## 2022-12-21 PROCEDURE — 1210000000 HC MED SURG R&B

## 2022-12-21 PROCEDURE — 6370000000 HC RX 637 (ALT 250 FOR IP)

## 2022-12-21 PROCEDURE — 92610 EVALUATE SWALLOWING FUNCTION: CPT

## 2022-12-21 PROCEDURE — 94761 N-INVAS EAR/PLS OXIMETRY MLT: CPT

## 2022-12-21 PROCEDURE — 6360000002 HC RX W HCPCS: Performed by: HOSPITALIST

## 2022-12-21 PROCEDURE — 80048 BASIC METABOLIC PNL TOTAL CA: CPT

## 2022-12-21 RX ORDER — NITROGLYCERIN 0.4 MG/1
0.4 TABLET SUBLINGUAL EVERY 5 MIN PRN
Status: DISCONTINUED | OUTPATIENT
Start: 2022-12-21 | End: 2022-12-26 | Stop reason: HOSPADM

## 2022-12-21 RX ORDER — FENTANYL CITRATE 50 UG/ML
25 INJECTION, SOLUTION INTRAMUSCULAR; INTRAVENOUS
Status: DISCONTINUED | OUTPATIENT
Start: 2022-12-21 | End: 2022-12-22

## 2022-12-21 RX ORDER — MIRTAZAPINE 15 MG/1
7.5 TABLET, FILM COATED ORAL NIGHTLY
Status: DISCONTINUED | OUTPATIENT
Start: 2022-12-21 | End: 2022-12-26 | Stop reason: HOSPADM

## 2022-12-21 RX ADMIN — ACETAMINOPHEN 650 MG: 325 TABLET, FILM COATED ORAL at 06:05

## 2022-12-21 RX ADMIN — SODIUM CHLORIDE: 9 INJECTION, SOLUTION INTRAVENOUS at 00:55

## 2022-12-21 RX ADMIN — Medication 1 TABLET: at 08:19

## 2022-12-21 RX ADMIN — ISOSORBIDE MONONITRATE 30 MG: 30 TABLET, EXTENDED RELEASE ORAL at 08:19

## 2022-12-21 RX ADMIN — ACETAMINOPHEN 650 MG: 325 TABLET, FILM COATED ORAL at 16:18

## 2022-12-21 RX ADMIN — APIXABAN 2.5 MG: 2.5 TABLET, FILM COATED ORAL at 08:18

## 2022-12-21 RX ADMIN — LEVOTHYROXINE SODIUM 50 MCG: 50 TABLET ORAL at 06:05

## 2022-12-21 RX ADMIN — LISINOPRIL 40 MG: 20 TABLET ORAL at 08:19

## 2022-12-21 RX ADMIN — MIRTAZAPINE 7.5 MG: 7.5 TABLET ORAL at 21:09

## 2022-12-21 RX ADMIN — Medication 1000 UNITS: at 08:19

## 2022-12-21 RX ADMIN — ACETAMINOPHEN 650 MG: 325 TABLET, FILM COATED ORAL at 10:43

## 2022-12-21 RX ADMIN — METOPROLOL SUCCINATE 25 MG: 25 TABLET, EXTENDED RELEASE ORAL at 08:19

## 2022-12-21 RX ADMIN — APIXABAN 2.5 MG: 2.5 TABLET, FILM COATED ORAL at 20:49

## 2022-12-21 RX ADMIN — FENTANYL CITRATE 25 MCG: 50 INJECTION, SOLUTION INTRAMUSCULAR; INTRAVENOUS at 20:50

## 2022-12-21 ASSESSMENT — ENCOUNTER SYMPTOMS
WHEEZING: 0
SINUS PAIN: 0
COUGH: 0
BLOOD IN STOOL: 0
ABDOMINAL PAIN: 0
VOMITING: 0
SORE THROAT: 0
NAUSEA: 0
SHORTNESS OF BREATH: 0
DIARRHEA: 0
ABDOMINAL DISTENTION: 0
TROUBLE SWALLOWING: 0
CONSTIPATION: 0

## 2022-12-21 ASSESSMENT — PAIN DESCRIPTION - LOCATION
LOCATION: PELVIS
LOCATION: HIP;LEG
LOCATION: PELVIS
LOCATION: PELVIS

## 2022-12-21 ASSESSMENT — PAIN SCALES - GENERAL
PAINLEVEL_OUTOF10: 3
PAINLEVEL_OUTOF10: 0
PAINLEVEL_OUTOF10: 10
PAINLEVEL_OUTOF10: 3
PAINLEVEL_OUTOF10: 2

## 2022-12-21 ASSESSMENT — PAIN DESCRIPTION - DESCRIPTORS
DESCRIPTORS: ACHING

## 2022-12-21 ASSESSMENT — PAIN DESCRIPTION - PAIN TYPE
TYPE: ACUTE PAIN
TYPE: ACUTE PAIN

## 2022-12-21 ASSESSMENT — PAIN - FUNCTIONAL ASSESSMENT
PAIN_FUNCTIONAL_ASSESSMENT: ACTIVITIES ARE NOT PREVENTED
PAIN_FUNCTIONAL_ASSESSMENT: PREVENTS OR INTERFERES SOME ACTIVE ACTIVITIES AND ADLS
PAIN_FUNCTIONAL_ASSESSMENT: PREVENTS OR INTERFERES WITH MANY ACTIVE NOT PASSIVE ACTIVITIES

## 2022-12-21 ASSESSMENT — PAIN DESCRIPTION - ONSET: ONSET: ON-GOING

## 2022-12-21 ASSESSMENT — PAIN SCALES - WONG BAKER: WONGBAKER_NUMERICALRESPONSE: 4

## 2022-12-21 ASSESSMENT — PAIN DESCRIPTION - FREQUENCY
FREQUENCY: INTERMITTENT
FREQUENCY: INTERMITTENT

## 2022-12-21 ASSESSMENT — PAIN DESCRIPTION - ORIENTATION
ORIENTATION: RIGHT;LEFT
ORIENTATION: RIGHT
ORIENTATION: RIGHT

## 2022-12-21 NOTE — PROGRESS NOTES
Speech Language Pathology  Facility/Department: [facility]   CLINICAL BEDSIDE SWALLOW EVALUATION  SPEECH PRODUCTION EVALUATION     NAME: Norman Vázquez  : 1932  MRN: [id number]    ADMISSION DATE: 2022  ADMITTING DIAGNOSIS: has Closed fracture of other parts of pelvis, initial encounter ([address] Fall; and Syncope and collapse on their problem list.    Date of Eval: 2022  Evaluating Therapist: BELLE Kincaid    Current Diet level:  Soft and bite sized consistency with thin liquids    Pain:  Pain Assessment: Cr-Baker FACES  Pain Level: 2  Cr-Baker Pain Rating: Hurts little more  Patient's Stated Pain Goal: 0 - No pain  Pain Location: Pelvis  Pain Orientation: Right  Pain Descriptors: Aching  Functional Pain Assessment: Prevents or interferes some active activities and ADLs  Pain Type: Acute pain  Pain Radiating Towards: na  Pain Frequency: Intermittent  Pain Onset: On-going  Non-Pharmaceutical Pain Intervention(s): Repositioned    Reason for Referral  Norman Vázquez was referred for a bedside swallow evaluation to assess the efficiency of her swallow function, identify signs and symptoms of aspiration and make recommendations regarding safe dietary consistencies, effective compensatory strategies, and safe eating environment. Impression  Assessed patient's swallowing function. Patient exhibited decreased oral prep of more solid consistencies, inconsistently fast oral transit and suspected swallow delay with thin liquids, and sluggish, inconsistently mildly decreased laryngeal elevation for swallow airway protection. Even so, no outward S/S penetration/aspiration was observed with any ice chip trial, puree consistency trial, regular solid consistency trial, or thin H2O trial presented during evaluation this date. It is noted that patient verbalized preference for pre-cut foods at this time.  Would recommend continuation soft and bite sized consistency with least restrictive thin liquids. Recommend meds whole in pudding/applesauce. Will continue to follow. Thank you for this consult. Treatment Plan  Requires SLP Intervention: Yes     Recommended Diet and Intervention  Diet Solids Recommendation: Soft and bite sized  Liquid Consistency Recommendation: Thin  Recommended Form of Meds: Meds whole in puree as able  Therapeutic Interventions: Patient/Family education;Diet tolerance monitoring; Therapeutic PO trials with SLP     Compensatory Swallowing Strategies  Compensatory Swallowing Strategies: Upright as possible for all oral intake;Small bites/sips;Eat/Feed slowly; Alternate solids and liquids; Remain upright for 30-45 minutes after meals     Treatment/Goals  Timeframe for Short-term Goals: 1-2x/day for 3 days   Goal 1: Patient will tolerate soft and bite sized consistency and thin liquids with min S/S penetration/aspiration during PO intake. Goal 2: Patient staff will follow swallow safety recommendations to decrease risk of penetration/aspiration during PO intake. Goal 3: Patient will receive daily oral care to decrease bacteria from the oral cavity. Goal 4: Re-assessment of swallow function for potential diet upgrade. Goal 5: Re-assessment of speech production. General  Chart Reviewed: Yes  Behavior/Cognition: Alert; Cooperative  O2 Device: None (Room air)  Communication Observation: (Assessed patient's speech production. Patient exhibited decreased volume of speech and slow, decreased lingual movements during verbalizations. SLP still ranked functional intelligibility of speech for unfamiliar listeners at 100% in utterances with background noise present.)  Follows Directions: Simple   Dentition: Upper plate; Some missing dentition  Patient Positioning: Reclined in bed  Consistencies Administered: Ice chips;Dysphagia Pureed (Dysphagia I); Regular solid; Thin - straw     Oral Motor Examination   Labial ROM: (Decreased, on the right, during labial protrusion trials.)  Labial Strength: (Adequate during labial compression trials.)  Labial Coordination: (Adequate movements were noted.)  Lingual ROM: (Adequate during lingual extension trials with full point achieved; decreased during lingual elevation trials without use of accessory jaw movement; adequate movements noted bilaterally.)  Lingual Strength: (Decreased.)  Lingual Coordination: (Slowed movements were noted.)     Assessed patient's swallowing function with the following observations noted:     Oral Phase  Mastication: Ice chips;Regular solid (Patient exhibited primarily vertical jaw movement at the front of the mouth during oral prep of ice chip trials and regular solid consistency trials presented by SLP.)  Suspected Premature Bolus Loss: Ice chips;Regular solid; Thin - straw (Patient exhibited inconsistently fast oral transit of ice chip trials. Oral transit of regular solid consistency trials varied from 1-3 seconds in length. Patient exhibited inconsistently fast oral transit of thin H2O trials presented via straw by SLP.)  Oral Phase - Comment: Oral transit of puree consistency trials, presented by SLP, primarily measured 1-2 seconds in length and no oral cavity residue was noted post swallows. Min regular solid consistency residue was observed post swallows; residue cleared from the mouth with additional dry swallows. Pharyngeal Phase  Suspected Swallow Delay: Ice chips;Regular solid; Thin - straw (Suspect secondary to oral transit times.)  Laryngeal Elevation: (Patient exhibited sluggish, inconsistently mildly decreased laryngeal elevation for swallow airway protection.)  Pharyngeal Phase - Comment: No outward S/S penetration/aspiration was observed with any ice chip trial, puree consistency trial, regular solid consistency trial, or thin H2O trial presented during evaluation this date. It is noted that patient verbalized preference for pre-cut foods at this time.  Would recommend continuation soft and bite sized consistency with least restrictive thin liquids. Recommend meds whole in pudding/applesauce. Will continue to follow.       Electronically signed by BELLE Shore on 12/21/2022 at 11:26 AM

## 2022-12-21 NOTE — PROGRESS NOTES
[facility]      [facility] Note    Patient:  Elizabeth Pozo  YOB: 1932  Date of Service: 12/21/2022  MRN: [id number]   Acct: [de-identified]   Primary Care Physician: Reggie Gee  Advance Directive: Full Code  Admit Date: 12/16/2022       Hospital Day: 5    Portions of this note have been copied forward, however, updated to reflect the most current clinical status of this patient. CHIEF COMPLAINT Fall     SUBJECTIVE:  Ms. Rufino Maguire was resting in bed this morning. Reported tolerable Left hip pain. Denies SOB or chest pain at this time. CUMULATIVE HOSPITAL COURSE:   The patient is a 80 y.o. female with PMH of HTN, hypothyroidism, AF who presented to [facility] ED with complaints of fall. Stated she became dizzy, lightheadedness and LOC on day of admission. Reported landing on her right hip and head injury. Denied loss of bowel or bladder. Stated she was unable to ambulate after fall. Received IV fentanyl and IV Ativan due to increased pain in ED. Upon arrival to floor rapid response was called due to oxygen saturation in the 60s. Manolo Le NP and Dr. Fernie Gray present and 0.2 mg Narcan given, patient arousable to speech, and answering questions appropriately. Continuous pulse oximetry and monitor on telemetry on medical floor at this time. Work-up in ER CXR no acute cardiopulmonary process, CT lumbar spine no acute fracture or subluxation, CT head no acute intracranial abnormality, CT pelvis multiple fractures of right sacrum, right inferior pubic rami and left pubic symphysis, no subluxation or dislocation, soft tissue swelling/blood products to the angulated right inferior pubic rami fracture, right hip prosthetic appears intact. Patient was admitted to hospital medicine for pelvic fracture with orthopedic surgery consultation. Orthopedic surgery recommended nonoperative treatment, pain control, DVT prophylaxis, weightbearing as tolerated with a walker.     12/18/2022   Ortho continues to follow, non-operative management. Case management assisting SNF placement in [facility]. Continue to monitor and trend daily labs. Potassium replacement for 3.5, will recheck in AM.    12/19/2022  Pending referral to [facility] SNF. Calcium 8.7, Oscal initiated, will follow-up with PCP on discharge. PT continue to management treatment. 12/20/2022   No change in patient status, continues to wait for SNF placement. Speech evaluation for swallowing study. Will change to Dysphagia, soft and bite size, no hard foods. 12/21/2022  Patient has placement, will discharge in early AM for EMS to transport patient to facility. No change in patients status, was up in chair with assist.  Speech evaluation - continuation soft and bite sized consistency with least restrictive thin liquids. Recommend meds whole in pudding/applesauce. Review of Systems   Constitutional:  Negative for chills, diaphoresis, fatigue and fever. HENT:  Negative for congestion, ear pain, sinus pain, sore throat and trouble swallowing. Eyes:  Negative for visual disturbance. Respiratory:  Negative for cough, shortness of breath and wheezing. Cardiovascular:  Negative for chest pain, palpitations and leg swelling. Gastrointestinal:  Negative for abdominal distention, abdominal pain, blood in stool, constipation, diarrhea, nausea and vomiting. Endocrine: Negative for cold intolerance and heat intolerance. Genitourinary:  Negative for difficulty urinating, flank pain, frequency and urgency. Musculoskeletal:  Positive for arthralgias. Negative for myalgias. Left hip pain    Skin: Negative. Neurological:  Negative for dizziness, syncope, weakness, light-headedness, numbness and headaches. Hematological:  Does not bruise/bleed easily. Psychiatric/Behavioral:  Negative for agitation, confusion and dysphoric mood.        Objective:   VITALS:  BP (!) 145/86   Pulse 68   Temp 98.4 °F (36.9 °C) (Temporal)   Resp 16 Ht 5' 2\" (1.575 m)   Wt 101 lb (45.8 kg)   SpO2 97%   BMI 18.47 kg/m²   24HR INTAKE/OUTPUT:    Intake/Output Summary (Last 24 hours) at 12/21/2022 1043  Last data filed at 12/21/2022 0553  Gross per 24 hour   Intake 2461.16 ml   Output 400 ml   Net 2061.16 ml         Physical Exam  Constitutional:       General: She is not in acute distress. Appearance: Normal appearance. She is not toxic-appearing or diaphoretic. HENT:      Head: Normocephalic and atraumatic. Right Ear: External ear normal.      Left Ear: External ear normal.      Nose: Nose normal. No congestion or rhinorrhea. Mouth/Throat:      Mouth: Mucous membranes are moist.      Pharynx: Oropharynx is clear. Eyes:      General: No scleral icterus. Extraocular Movements: Extraocular movements intact. Conjunctiva/sclera: Conjunctivae normal.   Cardiovascular:      Rate and Rhythm: Normal rate and regular rhythm. Pulses: Normal pulses. Heart sounds: Normal heart sounds. No murmur heard. No friction rub. No gallop. Pulmonary:      Effort: Pulmonary effort is normal. No respiratory distress. Breath sounds: Normal breath sounds. No wheezing, rhonchi or rales. Abdominal:      General: Abdomen is flat. Bowel sounds are normal. There is no distension. Palpations: Abdomen is soft. Tenderness: There is no abdominal tenderness. Musculoskeletal:         General: Tenderness present. No swelling. Normal range of motion. Cervical back: Normal range of motion and neck supple. Right lower leg: No edema. Left lower leg: No edema. Comments: Left hip pain   Skin:     General: Skin is warm and dry. Coloration: Skin is not jaundiced. Findings: No erythema, lesion or rash. Neurological:      General: No focal deficit present. Mental Status: She is alert and oriented to person, place, and time. Mental status is at baseline. Cranial Nerves: No cranial nerve deficit. Sensory: No sensory deficit. Motor: No weakness. Psychiatric:         Mood and Affect: Mood normal.         Behavior: Behavior normal.         Thought Content: Thought content normal.         Judgment: Judgment normal.          Medications:      sodium chloride 100 mL/hr at 12/21/22 0055    sodium chloride        oyster shell calcium w/D  1 tablet Oral Daily    OLANZapine  2.5 mg IntraMUSCular Once    sodium chloride flush  5-40 mL IntraVENous 2 times per day    isosorbide mononitrate  30 mg Oral Daily    levothyroxine  50 mcg Oral Daily    lisinopril  40 mg Oral Daily    metoprolol succinate  25 mg Oral Daily    Vitamin D  1,000 Units Oral Daily    apixaban  2.5 mg Oral BID     potassium chloride **OR** potassium alternative oral replacement **OR** potassium chloride, sodium chloride flush, sodium chloride, ondansetron **OR** ondansetron, polyethylene glycol, acetaminophen **OR** acetaminophen  ADULT ORAL NUTRITION SUPPLEMENT; Breakfast, Lunch, Dinner; Standard High Calorie/High Protein Oral Supplement  ADULT DIET; Dysphagia - Soft and Bite Sized     Lab and other Data:     Recent Labs     12/19/22  0250   WBC 11.7*   HGB 13.1   *       Recent Labs     12/19/22  0250      K 3.6      CO2 22   BUN 14   CREATININE 0.5   GLUCOSE 96       No results for input(s): AST, ALT, ALB, BILITOT, ALKPHOS in the last 72 hours. INR:   No results for input(s): INR in the last 72 hours. UA:  No results for input(s): NITRITE, COLORU, PHUR, LABCAST, WBCUA, RBCUA, MUCUS, TRICHOMONAS, YEAST, BACTERIA, CLARITYU, SPECGRAV, LEUKOCYTESUR, UROBILINOGEN, BILIRUBINUR, BLOODU, GLUCOSEU, AMORPHOUS in the last 72 hours. Invalid input(s): KETONESU    ABG:  No results for input(s): PHART, DOL6GEA, PO2ART, NGJ6QZP, BEART, HGBAE, L9RHSKTD, CARBOXHGBART in the last 72 hours. RAD:     CT Head W/O Contrast  Result Date: 12/16/2022     1. No acute intracranial abnormality.  2. Mild chronic changes the brain parenchyma from small vessel ischemic. 3. Age-appropriate atrophic changes. Recommendation: Follow up as clinically indicated. All CT scans at this facility utilize dose modulation, iterative reconstruction, and/or weight based dosing when appropriate to reduce radiation dose to as low as reasonably achievable. Dictated and Electronically Signed by Moira Garcia MD, MQSA CERTIFIED at [phone] 05:55:38 PM               CT LUMBAR SPINE WO CONTRAST  Result Date: 12/16/2022    1. Multilevel lumbar spondylosis which is moderate to marked in severity. Multilevel mild to moderate neural foraminal stenosis. No severe bony canal stenosis. 2. No acute abnormality. 3. Retroperitoneal soft tissues are unremarkable. Recommendation: Follow up as clinically indicated. All CT scans at this facility utilize dose modulation, iterative reconstruction, and/or weight based dosing when appropriate to reduce radiation dose to as low as reasonably achievable. Dictated and Electronically Signed by Moira Garcia MD, [address] [place] [address] at [id number] 05:46:42 PM               CT PELVIS WO CONTRAST Additional Contrast? None  Result Date: 12/16/2022    1. Multiple fractures of the right sacrum, right inferior pubic rami and left pubic symphysis. 2. No subluxation or dislocation. 3. Soft tissue swelling/blood products adjacent to the angulated right inferior pubic rami fracture. 4. Right hip prosthesis appears intact. Recommendation: Follow up as clinically indicated. All CT scans at this facility utilize dose modulation, iterative reconstruction, and/or weight based dosing when appropriate to reduce radiation dose to as low as reasonably achievable. Dictated and Electronically Signed by Moira aGrcia MD, MQSA CERTIFIED at [id number] 05:52:09 PM               XR CHEST PORTABLE  Result Date: 12/16/2022    No acute cardiopulmonary process identified. XR HIP 2-3 VW W PELVIS RIGHT  Result Date: 12/16/2022    The bones are diffusely demineralized. Question nondisplaced right inferior pubic ramus fracture and minimally displaced left superior pubic ramus fracture. Intramedullary sudhir is seen in the right femur. The hardware appears intact. A CT showed be performed for further characterization. Vascular calcifications. Mohan Villareal [[id number]] Collected: 12/16/22 1333   Order Status: Completed Specimen: Nasopharyngeal Swab Updated: 12/16/22 1402    SARS-CoV-2, NAAT Not Detected       Assessment/Plan   Principal Problem:    Closed fracture of other parts of pelvis, initial encounter [facility])  Active Problems:    Fall    Syncope and collapse  Resolved Problems:    * No resolved hospital problems. *      Principal Problem:    Closed fracture of other parts of pelvis, initial encounter [facility])-    - Orthopedic surgery consulted    - Recommended nonoperative treatment, pain control, DVT prophylaxis, weightbearing as tolerated with a walker.   - Pain control   - PT/OT   - Case management assisting with DC planning, pending SNF placement, bed placement for 12/22. Active Problems:    Fall/ Syncope and collapse-    - Recent ECHO (11/29/2022) indicated trace TR, mild AR, severe TR, preserved LVEF 84%, grade 1 diastolic dysfunction    - neuro checks    - Orthostatic vitals    - fall precautions    - monitor on telemetry    - UA unremarkable     Elevated Ddimer -    - CTA pulmonary to rule out PE- Atelectasis and pneumonia is at the lung bases with traction bronchiectasis. Paraseptal emphysema. Calcified granuloma right upper lobe. - Supplemental oxygen as needed         DVT Prophylaxis: Eliquis     Discharge planning: Case management assisting with DC planning      Further Orders per Clinical course/attending. Electronically signed by YOVANA Gallegos CNP on 12/21/2022 at 10:43 AM       EMR Dragon/Transcription disclaimer:   Much of this encounter note is an electronic transcription/translation of spoken language to printed text.  The electronic translation of spoken language may permit erroneous, or at times, nonsensical words or phrases to be inadvertently transcribed; although attempts have made to review the note for such errors, some may still exist.

## 2022-12-21 NOTE — CARE COORDINATION
2nd IMM Letter given to patient/grandson. Reviewed, discussed, answered questions, and signed by patient's grandson. Signed copy placed in patient's chart.      12/21/22 1342   IMM Letter   IMM Letter given to Patient/Family/Significant other/Guardian/POA/by: given to patient/grandson at bedside by Nanetta Gilford RN BSN CM   IMM Letter date given: 12/21/22   IMM Letter time given: 1330   Electronically signed by Mick Haro RN, BSN on 12/21/2022 at 1:43 PM

## 2022-12-21 NOTE — CARE COORDINATION
I called [facility] and gave them Transport information for this patient for Thursday, December 21, 2022. Patient needs to be ready prior to 10 a.m. for EMS to .     Electronically signed by Lucinda Coto RN, BSN on 12/21/2022 at 1:52 PM

## 2022-12-21 NOTE — PROGRESS NOTES
Comprehensive Nutrition Assessment    Type and Reason for Visit:  Reassess    Nutrition Recommendations/Plan:   Continue POC     Malnutrition Assessment:  Malnutrition Status: At risk for malnutrition (Comment) (12/21/22 1219)    Context:  Acute Illness     Findings of the 6 clinical characteristics of malnutrition:  Energy Intake:  No significant decrease in energy intake  Weight Loss:  No significant weight loss     Body Fat Loss: Moderate body fat loss Orbital, Buccal region   Muscle Mass Loss:  Mild muscle mass loss    Fluid Accumulation:  No significant fluid accumulation     Strength:  Not Performed    Nutrition Assessment:    Pt's oral intake is improving. Labs indicate good nutritional status. IVF infusing for hydration. Continue POC. Current Nutrition Intake & Therapies:    Average Meal Intake: 51-75%  ADULT ORAL NUTRITION SUPPLEMENT; Breakfast, Lunch, Dinner; Standard High Calorie/High Protein Oral Supplement  ADULT DIET; Dysphagia - Soft and Bite Sized    Anthropometric Measures:  Height: 5' 2\" (157.5 cm)  Ideal Body Weight (IBW): 110 lbs (50 kg)    Admission Body Weight: 101 lb (45.8 kg)  Current Body Weight: 101 lb (45.8 kg), 91.8 % IBW.     Current BMI (kg/m2): 18.5  Usual Body Weight: 100 lb (45.4 kg) (9/2022)  % Weight Change (Calculated): 1  BMI Categories: Underweight (BMI less than 22) age over 72    Estimated Daily Nutrient Needs:  Energy Requirements Based On: Kcal/kg  Weight Used for Energy Requirements: Current  Energy (kcal/day): 0879-1099 kcals/day (30-35)  Weight Used for Protein Requirements: Current  Protein (g/day): 69 g/protein/day  Method Used for Fluid Requirements: 1 ml/kcal  Fluid (ml/day): 9531-5791 mL/day    Nutrition Diagnosis:   No nutrition diagnosis at this time     Nutrition Interventions:   Food and/or Nutrient Delivery: Continue Current Diet, Continue Oral Nutrition Supplement  Coordination of Nutrition Care: Continue to monitor while inpatient    Goals:  Previous Goal Met: Goal(s) Achieved  Goals: PO intake 75% or greater    Nutrition Monitoring and Evaluation:   Behavioral-Environmental Outcomes: None Identified  Food/Nutrient Intake Outcomes: Food and Nutrient Intake, Supplement Intake  Physical Signs/Symptoms Outcomes: Biochemical Data, Weight    Colby Watts MS, RD, LD  Contact: [phone]

## 2022-12-21 NOTE — PROGRESS NOTES
Physical Therapy  Name: Katheryn London  MRN:  [id number]  Date of service:  12/21/2022 12/21/22 1023   Lower Extremity Weight Bearing Restrictions   Right Lower Extremity Weight Bearing Weight Bearing As Tolerated   Left Lower Extremity Weight Bearing Weight Bearing As Tolerated   Subjective   Subjective Pt agreed to therapy. Pain Assessment   Pain Assessment Cr-Baker FACES   Cr-Baker Pain Rating 4   Pain Location Pelvis   Pain Orientation Right   Pain Descriptors Aching   Functional Pain Assessment Prevents or interferes some active activities and ADLs   Pain Type Acute pain   Pain Frequency Intermittent   Non-Pharmaceutical Pain Intervention(s) Ambulation/Increased Activity;Repositioned; Rest   Response to Pain Intervention Patient satisfied   Bed Mobility   Supine to Sit Moderate assistance   Transfers   Sit to Stand Minimal Assistance;2 Person Assistance   Stand to Sit Minimal Assistance;2 Person Assistance   Bed to Chair Minimal assistance  (stand step RW)   Exercises   Knee Long Arc Quad x10   Ankle Pumps x10   Comments BLE exercise   Short Term Goals   Time Frame for Short Term Goals 2 weeks   Short Term Goal 1 Require no more than minimal assist for bed mobility   Short Term Goal 2 Require no more than minimal assist for sit to stand transfers   Short Term Goal 3 Ambulate 30 feet with assistive device and minimum assist of 1   Conditions Requiring Skilled Therapeutic Intervention   Body Structures, Functions, Activity Limitations Requiring Skilled Therapeutic Intervention Decreased functional mobility    Assessment Pt able to stand step with RW, decreased weight bearing on RLE. Worked on some LE exercise within pts pain tolerance. Up to chair with all needs in reach. Activity Tolerance   Activity Tolerance Patient tolerated treatment well   PT Plan of Care   Wednesday X   Safety Devices   Type of Devices Call light within reach; Chair alarm in place; Left in chair         Electronically signed by Sari Driscoll, PTA on 12/21/2022 at 10:34 AM

## 2022-12-21 NOTE — PROGRESS NOTES
Occupational Therapy  Facility/Department: [facility] SURG SERVICES  Daily Treatment Note  NAME: Miki Schmidt  : 1932  MRN: [id number]    Date of Service: 2022    Discharge Recommendations:  Patient would benefit from continued therapy after discharge, 24 hour supervision or assist       Assessment   Performance deficits / Impairments: Decreased functional mobility ; Decreased balance;Decreased ADL status; Decreased cognition;Decreased endurance;Decreased high-level IADLs;Decreased strength  Assessment: Pt tolerated tx well. Pt would benefit from skilled OT to increase independence in ADL and functional transfers/mobility. Anticipate pt will require 24 hr assist at d/c. Treatment Diagnosis: decreased ADL/functional mobility  Prognosis: Good  Activity Tolerance  Activity Tolerance: Patient Tolerated treatment well         Patient Diagnosis(es): The encounter diagnosis was Closed fracture of other parts of pelvis, initial encounter ([facility] [address] has a past medical history of Atrial fibrillation [facility]), Cardiac pacemaker, Hypertension, and Hypothyroidism. has a past surgical history that includes Appendectomy; Tonsillectomy; Hysterectomy; knee surgery (Right); Cholecystectomy; and Eye surgery. Restrictions  Lower Extremity Weight Bearing Restrictions  Right Lower Extremity Weight Bearing: Weight Bearing As Tolerated  Left Lower Extremity Weight Bearing: Weight Bearing As Tolerated  Subjective   General  Chart Reviewed: Yes  Patient assessed for rehabilitation services?: Yes  Response to previous treatment: Patient with no complaints from previous session  Family / Caregiver Present: No  Diagnosis: pelvic fx  Pre Treatment Pain Screening  Pain at present: 7  Scale Used: Numeric Score  Intervention List: Patient able to continue with treatment  Comments / Details: .    Orientation     Objective                Bed mobility  Supine to Sit: Moderate assistance  Transfers  Stand Step Transfers: Minimal assistance  Sit to stand: Minimal assistance;2 Person assistance  Stand to sit: Minimal assistance;2 Person assistance                                                           Plan   Occupational Therapy Plan  Times Per Week: 3-5  Current Treatment Recommendations: Strengthening, Balance training, Functional mobility training, Endurance training, Safety education & training, Pain management, Cognitive reorientation, Patient/Caregiver education & training, Equipment evaluation, education, & procurement, Self-Care / ADL, Home management training  Goals  Short Term Goals  Time Frame for Short Term Goals: 2 weeks  Short Term Goal 1: Pt will toilet with SBA  Short Term Goal 2: Toilet transfer with SBA, DME prn  Short Term Goal 3: Bathe with SBA, AE/DME prn  Short Term Goal 4: LB dressing with SBA, AE prn  Short Term Goal 5: Perform 1-2 handed functional standing activities with SBA x 3 min to enhance ADL performance  Long Term Goals  Long Term Goal 1: upgrade as tolerated       Therapy Time   Individual Concurrent Group Co-treatment   Time In           Time Out           Minutes              JOSEFA Meade  Electronically signed by JOSEFA Meade on 12/21/2022 at 11:47 AM

## 2022-12-22 LAB
ADENOVIRUS BY PCR: NOT DETECTED
BORDETELLA PARAPERTUSSIS BY PCR: NOT DETECTED
BORDETELLA PERTUSSIS BY PCR: NOT DETECTED
CHLAMYDOPHILIA PNEUMONIAE BY PCR: NOT DETECTED
CORONAVIRUS 229E BY PCR: NOT DETECTED
CORONAVIRUS HKU1 BY PCR: NOT DETECTED
CORONAVIRUS NL63 BY PCR: NOT DETECTED
CORONAVIRUS OC43 BY PCR: NOT DETECTED
CULTURE, BLOOD 2: NORMAL
HUMAN METAPNEUMOVIRUS BY PCR: NOT DETECTED
HUMAN RHINOVIRUS/ENTEROVIRUS BY PCR: NOT DETECTED
INFLUENZA A BY PCR: NOT DETECTED
INFLUENZA B BY PCR: NOT DETECTED
MYCOPLASMA PNEUMONIAE BY PCR: NOT DETECTED
PARAINFLUENZA VIRUS 1 BY PCR: NOT DETECTED
PARAINFLUENZA VIRUS 2 BY PCR: NOT DETECTED
PARAINFLUENZA VIRUS 3 BY PCR: NOT DETECTED
PARAINFLUENZA VIRUS 4 BY PCR: NOT DETECTED
RESPIRATORY SYNCYTIAL VIRUS BY PCR: NOT DETECTED
SARS-COV-2, PCR: DETECTED

## 2022-12-22 PROCEDURE — 2580000003 HC RX 258

## 2022-12-22 PROCEDURE — 97530 THERAPEUTIC ACTIVITIES: CPT

## 2022-12-22 PROCEDURE — 97535 SELF CARE MNGMENT TRAINING: CPT

## 2022-12-22 PROCEDURE — 6370000000 HC RX 637 (ALT 250 FOR IP)

## 2022-12-22 PROCEDURE — 94760 N-INVAS EAR/PLS OXIMETRY 1: CPT

## 2022-12-22 PROCEDURE — 97116 GAIT TRAINING THERAPY: CPT

## 2022-12-22 PROCEDURE — 1210000000 HC MED SURG R&B

## 2022-12-22 PROCEDURE — 6370000000 HC RX 637 (ALT 250 FOR IP): Performed by: HOSPITALIST

## 2022-12-22 PROCEDURE — 6370000000 HC RX 637 (ALT 250 FOR IP): Performed by: STUDENT IN AN ORGANIZED HEALTH CARE EDUCATION/TRAINING PROGRAM

## 2022-12-22 PROCEDURE — 0202U NFCT DS 22 TRGT SARS-COV-2: CPT

## 2022-12-22 RX ORDER — TRAMADOL HYDROCHLORIDE 50 MG/1
50 TABLET ORAL EVERY 6 HOURS PRN
Qty: 12 TABLET | Refills: 0 | Status: SHIPPED | OUTPATIENT
Start: 2022-12-22 | End: 2022-12-26 | Stop reason: HOSPADM

## 2022-12-22 RX ORDER — NAPROXEN 500 MG/1
500 TABLET ORAL 2 TIMES DAILY PRN
Qty: 60 TABLET | Refills: 0 | DISCHARGE
Start: 2022-12-22

## 2022-12-22 RX ORDER — TRAMADOL HYDROCHLORIDE 50 MG/1
50 TABLET ORAL EVERY 6 HOURS PRN
Status: DISCONTINUED | OUTPATIENT
Start: 2022-12-22 | End: 2022-12-24

## 2022-12-22 RX ORDER — METOPROLOL SUCCINATE 50 MG/1
50 TABLET, EXTENDED RELEASE ORAL DAILY
Status: DISCONTINUED | OUTPATIENT
Start: 2022-12-23 | End: 2022-12-25

## 2022-12-22 RX ORDER — METOPROLOL SUCCINATE 25 MG/1
25 TABLET, EXTENDED RELEASE ORAL ONCE
Status: COMPLETED | OUTPATIENT
Start: 2022-12-22 | End: 2022-12-22

## 2022-12-22 RX ORDER — ACETAMINOPHEN 500 MG
1000 TABLET ORAL EVERY 6 HOURS PRN
Qty: 120 TABLET | Refills: 0 | DISCHARGE
Start: 2022-12-22

## 2022-12-22 RX ADMIN — APIXABAN 2.5 MG: 2.5 TABLET, FILM COATED ORAL at 08:00

## 2022-12-22 RX ADMIN — LEVOTHYROXINE SODIUM 50 MCG: 50 TABLET ORAL at 05:43

## 2022-12-22 RX ADMIN — ISOSORBIDE MONONITRATE 30 MG: 30 TABLET, EXTENDED RELEASE ORAL at 08:00

## 2022-12-22 RX ADMIN — SODIUM CHLORIDE, PRESERVATIVE FREE 10 ML: 5 INJECTION INTRAVENOUS at 19:55

## 2022-12-22 RX ADMIN — APIXABAN 2.5 MG: 2.5 TABLET, FILM COATED ORAL at 19:55

## 2022-12-22 RX ADMIN — METOPROLOL SUCCINATE 25 MG: 25 TABLET, EXTENDED RELEASE ORAL at 08:00

## 2022-12-22 RX ADMIN — METOPROLOL SUCCINATE 25 MG: 25 TABLET, EXTENDED RELEASE ORAL at 08:51

## 2022-12-22 RX ADMIN — LISINOPRIL 40 MG: 20 TABLET ORAL at 08:01

## 2022-12-22 RX ADMIN — Medication 1 TABLET: at 08:00

## 2022-12-22 RX ADMIN — MIRTAZAPINE 7.5 MG: 7.5 TABLET ORAL at 19:55

## 2022-12-22 RX ADMIN — TRAMADOL HYDROCHLORIDE 50 MG: 50 TABLET, COATED ORAL at 17:59

## 2022-12-22 RX ADMIN — Medication 1000 UNITS: at 08:01

## 2022-12-22 ASSESSMENT — ENCOUNTER SYMPTOMS
ABDOMINAL PAIN: 0
BLOOD IN STOOL: 0
CONSTIPATION: 0
SINUS PAIN: 0
SHORTNESS OF BREATH: 0
TROUBLE SWALLOWING: 0
DIARRHEA: 0
COUGH: 0
NAUSEA: 0
ABDOMINAL DISTENTION: 0
VOMITING: 0
WHEEZING: 0
SORE THROAT: 0

## 2022-12-22 ASSESSMENT — PAIN DESCRIPTION - LOCATION
LOCATION: PELVIS

## 2022-12-22 ASSESSMENT — PAIN SCALES - GENERAL
PAINLEVEL_OUTOF10: 0
PAINLEVEL_OUTOF10: 4
PAINLEVEL_OUTOF10: 2

## 2022-12-22 ASSESSMENT — PAIN DESCRIPTION - DESCRIPTORS
DESCRIPTORS: ACHING
DESCRIPTORS: ACHING
DESCRIPTORS: ACHING;DISCOMFORT
DESCRIPTORS: ACHING

## 2022-12-22 ASSESSMENT — PAIN DESCRIPTION - PAIN TYPE
TYPE: ACUTE PAIN
TYPE: ACUTE PAIN

## 2022-12-22 ASSESSMENT — PAIN DESCRIPTION - FREQUENCY
FREQUENCY: INTERMITTENT
FREQUENCY: INTERMITTENT

## 2022-12-22 ASSESSMENT — PAIN SCALES - WONG BAKER
WONGBAKER_NUMERICALRESPONSE: 0
WONGBAKER_NUMERICALRESPONSE: 6
WONGBAKER_NUMERICALRESPONSE: 6

## 2022-12-22 ASSESSMENT — PAIN - FUNCTIONAL ASSESSMENT
PAIN_FUNCTIONAL_ASSESSMENT: PREVENTS OR INTERFERES SOME ACTIVE ACTIVITIES AND ADLS
PAIN_FUNCTIONAL_ASSESSMENT: PREVENTS OR INTERFERES SOME ACTIVE ACTIVITIES AND ADLS

## 2022-12-22 ASSESSMENT — PAIN DESCRIPTION - ORIENTATION
ORIENTATION: RIGHT
ORIENTATION: RIGHT

## 2022-12-22 NOTE — PROGRESS NOTES
[facility]      [facility] Note    Patient:  Panfilo Barger  YOB: 1932  Date of Service: 12/22/2022  MRN: [id number]   Acct: [de-identified]   Primary Care Physician: Talya Allen  Advance Directive: Full Code  Admit Date: 12/16/2022       Hospital Day: 6    Portions of this note have been copied forward, however, updated to reflect the most current clinical status of this patient. CHIEF COMPLAINT Fall     SUBJECTIVE:  Ms. Talya Chau was resting in bed this morning. Reported tolerable Left hip pain. Denies SOB or chest pain at this time. CUMULATIVE HOSPITAL COURSE:   The patient is a 80 y.o. female with PMH of HTN, hypothyroidism, AF who presented to [facility] ED with complaints of fall. Stated she became dizzy, lightheadedness and LOC on day of admission. Reported landing on her right hip and head injury. Denied loss of bowel or bladder. Stated she was unable to ambulate after fall. Received IV fentanyl and IV Ativan due to increased pain in ED. Upon arrival to floor rapid response was called due to oxygen saturation in the 60s. Leisa Menendez NP and Dr. Rand Laughlin present and 0.2 mg Narcan given, patient arousable to speech, and answering questions appropriately. Continuous pulse oximetry and monitor on telemetry on medical floor at this time. Work-up in ER CXR no acute cardiopulmonary process, CT lumbar spine no acute fracture or subluxation, CT head no acute intracranial abnormality, CT pelvis multiple fractures of right sacrum, right inferior pubic rami and left pubic symphysis, no subluxation or dislocation, soft tissue swelling/blood products to the angulated right inferior pubic rami fracture, right hip prosthetic appears intact. Patient was admitted to hospital medicine for pelvic fracture with orthopedic surgery consultation. Orthopedic surgery recommended nonoperative treatment, pain control, DVT prophylaxis, weightbearing as tolerated with a walker.     12/18/2022   Ortho continues to follow, non-operative management. Case management assisting SNF placement in [facility]. Continue to monitor and trend daily labs. Potassium replacement for 3.5, will recheck in AM.    12/19/2022  Pending referral to [facility] SNF. Calcium 8.7, Oscal initiated, will follow-up with PCP on discharge. PT continue to management treatment. 12/20/2022   No change in patient status, continues to wait for SNF placement. Speech evaluation for swallowing study. Will change to Dysphagia, soft and bite size, no hard foods. 12/21/2022  Patient has placement, will discharge in early AM for EMS to transport patient to facility. No change in patients status, was up in chair with assist.  Speech evaluation - continuation soft and bite sized consistency with least restrictive thin liquids. Recommend meds whole in pudding/applesauce. 12/22/2022   Patient positive PCR at discharge, unable to discharge to SNF at this time. Will transfer patient to 4th floor, Covid precautions. Review of Systems   Constitutional:  Negative for chills, diaphoresis, fatigue and fever. HENT:  Negative for congestion, ear pain, sinus pain, sore throat and trouble swallowing. Eyes:  Negative for visual disturbance. Respiratory:  Negative for cough, shortness of breath and wheezing. Cardiovascular:  Negative for chest pain, palpitations and leg swelling. Gastrointestinal:  Negative for abdominal distention, abdominal pain, blood in stool, constipation, diarrhea, nausea and vomiting. Endocrine: Negative for cold intolerance and heat intolerance. Genitourinary:  Negative for difficulty urinating, flank pain, frequency and urgency. Musculoskeletal:  Positive for arthralgias. Negative for myalgias. Left hip pain    Skin: Negative. Neurological:  Negative for dizziness, syncope, weakness, light-headedness, numbness and headaches. Hematological:  Does not bruise/bleed easily.    Psychiatric/Behavioral:  Negative for agitation, confusion and dysphoric mood. Objective:   VITALS:  BP (!) 147/84   Pulse (!) 114   Temp 98.4 °F (36.9 °C) (Temporal)   Resp 16   Ht 5' 2\" (1.575 m)   Wt 100 lb 1 oz (45.4 kg)   SpO2 97%   BMI 18.30 kg/m²   24HR INTAKE/OUTPUT:    Intake/Output Summary (Last 24 hours) at 12/22/2022 1022  Last data filed at 12/22/2022 0112  Gross per 24 hour   Intake 480 ml   Output 1200 ml   Net -720 ml         Physical Exam  Constitutional:       General: She is not in acute distress. Appearance: Normal appearance. She is not toxic-appearing or diaphoretic. HENT:      Head: Normocephalic and atraumatic. Right Ear: External ear normal.      Left Ear: External ear normal.      Nose: Nose normal. No congestion or rhinorrhea. Mouth/Throat:      Mouth: Mucous membranes are moist.      Pharynx: Oropharynx is clear. Eyes:      General: No scleral icterus. Extraocular Movements: Extraocular movements intact. Conjunctiva/sclera: Conjunctivae normal.   Cardiovascular:      Rate and Rhythm: Normal rate and regular rhythm. Pulses: Normal pulses. Heart sounds: Normal heart sounds. No murmur heard. No friction rub. No gallop. Pulmonary:      Effort: Pulmonary effort is normal. No respiratory distress. Breath sounds: Normal breath sounds. No wheezing, rhonchi or rales. Abdominal:      General: Abdomen is flat. Bowel sounds are normal. There is no distension. Palpations: Abdomen is soft. Tenderness: There is no abdominal tenderness. Musculoskeletal:         General: Tenderness present. No swelling. Normal range of motion. Cervical back: Normal range of motion and neck supple. Right lower leg: No edema. Left lower leg: No edema. Comments: Left hip pain   Skin:     General: Skin is warm and dry. Coloration: Skin is not jaundiced. Findings: No erythema, lesion or rash. Neurological:      General: No focal deficit present.       Mental Status: She is alert and oriented to person, place, and time. Mental status is at baseline. Cranial Nerves: No cranial nerve deficit. Sensory: No sensory deficit. Motor: No weakness. Psychiatric:         Mood and Affect: Mood normal.         Behavior: Behavior normal.         Thought Content: Thought content normal.         Judgment: Judgment normal.          Medications:      sodium chloride        [START ON 12/23/2022] metoprolol succinate  50 mg Oral Daily    mirtazapine  7.5 mg Oral Nightly    oyster shell calcium w/D  1 tablet Oral Daily    sodium chloride flush  5-40 mL IntraVENous 2 times per day    isosorbide mononitrate  30 mg Oral Daily    levothyroxine  50 mcg Oral Daily    lisinopril  40 mg Oral Daily    Vitamin D  1,000 Units Oral Daily    apixaban  2.5 mg Oral BID     nitroGLYCERIN, potassium chloride **OR** potassium alternative oral replacement **OR** potassium chloride, sodium chloride flush, sodium chloride, ondansetron **OR** ondansetron, polyethylene glycol, acetaminophen **OR** acetaminophen  ADULT ORAL NUTRITION SUPPLEMENT; Breakfast, Lunch, Dinner; Standard High Calorie/High Protein Oral Supplement  ADULT DIET; Dysphagia - Soft and Bite Sized     Lab and other Data:     Recent Labs     12/21/22  1024   WBC 6.1   HGB 11.6*          Recent Labs     12/21/22  1024      K 3.5   *   CO2 22   BUN 27*   CREATININE 0.5   GLUCOSE 116*       No results for input(s): AST, ALT, ALB, BILITOT, ALKPHOS in the last 72 hours. INR:   No results for input(s): INR in the last 72 hours. UA:  No results for input(s): NITRITE, COLORU, PHUR, LABCAST, WBCUA, RBCUA, MUCUS, TRICHOMONAS, YEAST, BACTERIA, CLARITYU, SPECGRAV, LEUKOCYTESUR, UROBILINOGEN, BILIRUBINUR, BLOODU, GLUCOSEU, AMORPHOUS in the last 72 hours. Invalid input(s): KETONESU    ABG:  No results for input(s): PHART, CUP9JDS, PO2ART, VIO5QRN, BEART, HGBAE, Z7GBGJVG, CARBOXHGBART in the last 72 hours.       RAD: CT Head W/O Contrast  Result Date: 12/16/2022     1. No acute intracranial abnormality. 2. Mild chronic changes the brain parenchyma from small vessel ischemic. 3. Age-appropriate atrophic changes. Recommendation: Follow up as clinically indicated. All CT scans at this facility utilize dose modulation, iterative reconstruction, and/or weight based dosing when appropriate to reduce radiation dose to as low as reasonably achievable. Dictated and Electronically Signed by Mireille Guerin MD MQSA CERTIFIED at [id number] 05:55:38 PM               CT LUMBAR SPINE WO CONTRAST  Result Date: 12/16/2022    1. Multilevel lumbar spondylosis which is moderate to marked in severity. Multilevel mild to moderate neural foraminal stenosis. No severe bony canal stenosis. 2. No acute abnormality. 3. Retroperitoneal soft tissues are unremarkable. Recommendation: Follow up as clinically indicated. All CT scans at this facility utilize dose modulation, iterative reconstruction, and/or weight based dosing when appropriate to reduce radiation dose to as low as reasonably achievable. Dictated and Electronically Signed by Mireille Guerin MD, [address] [place] [address] at [id number] 05:46:42 PM               CT PELVIS WO CONTRAST Additional Contrast? None  Result Date: 12/16/2022    1. Multiple fractures of the right sacrum, right inferior pubic rami and left pubic symphysis. 2. No subluxation or dislocation. 3. Soft tissue swelling/blood products adjacent to the angulated right inferior pubic rami fracture. 4. Right hip prosthesis appears intact. Recommendation: Follow up as clinically indicated. All CT scans at this facility utilize dose modulation, iterative reconstruction, and/or weight based dosing when appropriate to reduce radiation dose to as low as reasonably achievable.  Dictated and Electronically Signed by Mireille Guerin MD, MQSA CERTIFIED at [phone] 05:52:09 PM               XR CHEST PORTABLE  Result Date: 12/16/2022    No acute cardiopulmonary process identified. XR HIP 2-3 VW W PELVIS RIGHT  Result Date: 12/16/2022    The bones are diffusely demineralized. Question nondisplaced right inferior pubic ramus fracture and minimally displaced left superior pubic ramus fracture. Intramedullary sudhir is seen in the right femur. The hardware appears intact. A CT showed be performed for further characterization. Vascular calcifications. MicroMonique No, Rapid [[id number]] Collected: 12/16/22 1333   Order Status: Completed Specimen: Nasopharyngeal Swab Updated: 12/16/22 1402    SARS-CoV-2, NAAT Not Detected       Assessment/Plan   Principal Problem:    Closed fracture of other parts of pelvis, initial encounter [facility])  Active Problems:    Fall    Syncope and collapse  Resolved Problems:    * No resolved hospital problems. *      Principal Problem:    Closed fracture of other parts of pelvis, initial encounter [facility])-    - Orthopedic surgery consulted    - Recommended nonoperative treatment, pain control, DVT prophylaxis, weightbearing as tolerated with a walker.   - Pain control   - PT/OT   - Case management assisting with DC planning, pending SNF placement, bed placement for 12/22- on hold Covid    Active Problems:    Fall/ Syncope and collapse-    - Recent ECHO (11/29/2022) indicated trace TR, mild AR, severe TR, preserved LVEF 58%, grade 1 diastolic dysfunction    - neuro checks    - fall precautions     Elevated Ddimer -    - CTA pulmonary to rule out PE- Atelectasis and pneumonia is at the lung bases with traction bronchiectasis. Paraseptal emphysema. Calcified granuloma right upper lobe. - Supplemental oxygen as needed     Covid   -Quarantine, transfer to [facility]   -Isolation precautions   -Oxygen Therapy Protocol   -Monitor for symptoms      DVT Prophylaxis: Eliquis     Discharge planning: Case management assisting with DC planning      Further Orders per Clinical course/attending.      Electronically signed by YOVANA Keller - CNP on 12/22/2022 at 10:22 AM       EMR Dragon/Transcription disclaimer:   Much of this encounter note is an electronic transcription/translation of spoken language to printed text.  The electronic translation of spoken language may permit erroneous, or at times, nonsensical words or phrases to be inadvertently transcribed; although attempts have made to review the note for such errors, some may still exist.

## 2022-12-22 NOTE — PROGRESS NOTES
Physical Therapy  Name: Tomás Lyn  MRN:  [id number]  Date of service:  12/22/2022 12/22/22 1414   Lower Extremity Weight Bearing Restrictions   Right Lower Extremity Weight Bearing Weight Bearing As Tolerated   Left Lower Extremity Weight Bearing Weight Bearing As Tolerated   Subjective   Subjective I need to go to the bathroom   Oxygen Therapy   O2 Device None (Room air)   Bed Mobility   Supine to Sit Minimal assistance   Sit to Supine Minimal assistance   Transfers   Sit to Stand Minimal Assistance   Stand to Sit Minimal Assistance   Ambulation   WB Status WBAT   Ambulation   Device Rolling Walker   Assistance Minimal assistance   Gait Deviations Slow Zahraa;Decreased step length;Decreased step height   Distance 25 feet   Other Activities   Comment assisted to bathroom and back to bed. Short Term Goals   Time Frame for Short Term Goals 2 weeks   Short Term Goal 1 Require no more than minimal assist for bed mobility   Short Term Goal 2 Require no more than minimal assist for sit to stand transfers   Short Term Goal 3 Ambulate 30 feet with assistive device and minimum assist of 1   Conditions Requiring Skilled Therapeutic Intervention   Body Structures, Functions, Activity Limitations Requiring Skilled Therapeutic Intervention Decreased functional mobility    Assessment Patient able to amb with RW to the bathroom. Increasd time for amb due to pain. Patient able to stand for clean up and perform independently. Activity Tolerance   Activity Tolerance Patient limited by pain   Physcial Therapy Plan   Days Per Week 7 Days   Therapy Duration 2 Weeks   Current Treatment Recommendations Strengthening;Balance training;Functional mobility training;Transfer training;Gait training   PT Plan of Care   Thursday X   Safety Devices   Type of Devices Call light within reach; Bed alarm in place; Left in bed       Electronically signed by Thang Lucio PTA on 12/22/2022 at 2:19 PM

## 2022-12-22 NOTE — PROGRESS NOTES
Occupational Therapy     12/22/22 1500   Subjective   Subjective Pt sitting EOB and needing to get to the bathroom upon arrival for therapy. Pain Assessment   Pain Assessment Cr-Baker FACES   Cr-Baker Pain Rating 6   Pain Location Pelvis   Pain Orientation Right   Pain Descriptors Aching   Functional Pain Assessment Prevents or interferes some active activities and ADLs   Pain Type Acute pain   Pain Frequency Intermittent   Non-Pharmaceutical Pain Intervention(s) Ambulation/Increased Activity;Repositioned; Rest   Response to Pain Intervention Patient satisfied   Cognition   Overall Cognitive Status Exceptions   Orientation   Overall Orientation Status WFL   Bed Mobility Training   Bed Mobility Training Yes   Overall Level of Assistance Minimum assistance; Moderate assistance   Interventions Verbal cues; Tactile cues;Manual cues   Supine to Sit Minimum assistance; Moderate assistance   Sit to Supine Moderate assistance   Scooting Minimum assistance   Transfer Training   Transfer Training Yes   Overall Level of Assistance Minimum assistance   Interventions Verbal cues; Tactile cues;Manual cues   Sit to Stand Minimum assistance   Stand to Sit Minimum assistance   Toilet Transfer Minimum assistance   ADL   Feeding Setup   Grooming Setup   UE Bathing Setup;Stand by assistance   LE Bathing Moderate assistance;Maximum assistance   UE Dressing Stand by assistance;Setup   LE Dressing Maximum assistance   Toileting Maximum assistance   Assessment   Assessment Tx focused on toileting task at RW level. Pt requires cues for sequencing and Mod-Max assist for clothing management during task.    Activity Tolerance Patient limited by pain   Discharge Recommendations Patient would benefit from continued therapy after discharge;24 hour supervision or assist   Occupational Therapy Plan   Times Per Week 3-5   Times Per Day Once a day   OT Plan of Care   Thursday X   Electronically signed by JOSEFA Leos on 12/22/2022 at 3:11 PM

## 2022-12-22 NOTE — PROGRESS NOTES
Physical Therapy  Name: Sharlene Aguilar  MRN:  [id number]  Date of service:  12/22/2022 12/22/22 0936   Lower Extremity Weight Bearing Restrictions   Right Lower Extremity Weight Bearing Weight Bearing As Tolerated   Left Lower Extremity Weight Bearing Weight Bearing As Tolerated   Subjective   Subjective Pt agreed to therapy. Pain Assessment   Pain Assessment Cr-Baker FACES   Cr-Baker Pain Rating 6  (with activity)   Pain Location Pelvis   Pain Orientation Right   Pain Descriptors Aching   Functional Pain Assessment Prevents or interferes some active activities and ADLs   Pain Type Acute pain   Pain Frequency Intermittent   Non-Pharmaceutical Pain Intervention(s) Ambulation/Increased Activity;Repositioned; Rest   Response to Pain Intervention Patient satisfied   Transfers   Sit to Stand Minimal Assistance   Stand to Sit Minimal Assistance   Comment stood in front of chair for new brief change   Ambulation   WB Status WBAT   Ambulation   Device Rolling Walker   Assistance Minimal assistance   Gait Deviations Slow Zahraa;Decreased step length;Decreased step height  (pt requires frequent cueing for direction with short distance of amb)   Distance 8'   Short Term Goals   Time Frame for Short Term Goals 2 weeks   Short Term Goal 1 Require no more than minimal assist for bed mobility   Short Term Goal 2 Require no more than minimal assist for sit to stand transfers   Short Term Goal 3 Ambulate 30 feet with assistive device and minimum assist of 1   Conditions Requiring Skilled Therapeutic Intervention   Body Structures, Functions, Activity Limitations Requiring Skilled Therapeutic Intervention Decreased functional mobility    Assessment Pt able to amb short distance with RW but has some difficulty following cues for direction or amb. Increasd time for amb due to pain. Assisted pt to chair for pt to rest before changing brief. Pt able to stand for a few minutes without LOB. In chair with all needs in reach. Activity Tolerance   Activity Tolerance Patient limited by pain   PT Plan of Care   Thursday X   Safety Devices   Type of Devices Call light within reach; Chair alarm in place; Left in chair  (notified PCA)         Electronically signed by Christa Robert PTA on 12/22/2022 at 9:49 AM

## 2022-12-22 NOTE — CARE COORDINATION
Patient and family have accepted bed offer from [facility]. Patient can discharge 12/26/2022. Will need to call [phone] [facility] to set up transportation.     [facility]  Phone: [phone]  Fax: [phone]  Electronically signed by Lizette Allen RN, BSN on 12/22/2022 at 10:44 AM

## 2022-12-22 NOTE — DISCHARGE SUMMARY
Sharlene Aguilar  :  1932  MRN:  [id number]    Admit date:  2022  Discharge date:  2022    Discharging Physician:  Dr Azul Jason Directive: Full Code    Consults: [place] [address] [address]     Primary Care Physician:  Carlota Conner    Discharge Diagnoses:  Principal Problem:    Closed fracture of other parts of pelvis, initial encounter [facility])  Active Problems:    Fall    Syncope and collapse  Resolved Problems:    * No resolved hospital problems. *      Portions of this note have been copied forward, however, changed to reflect the most current clinical status of this patient. Hospital Course: The patient is a 80 y.o. female with PMH of HTN, hypothyroidism, AF who presented to [facility] ED with complaints of fall. Stated she became dizzy, lightheadedness and LOC on day of admission. Reported landing on her right hip and head injury. Denied loss of bowel or bladder. Stated she was unable to ambulate after fall. Received IV fentanyl and IV Ativan due to increased pain in ED. Upon arrival to floor rapid response was called due to oxygen saturation in the 60s. Ria Godfrey NP and Dr. Dimitry Babb present and 0.2 mg Narcan given, patient arousable to speech, and answering questions appropriately. Continuous pulse oximetry and monitor on telemetry on medical floor at this time. Work-up in ER CXR no acute cardiopulmonary process, CT lumbar spine no acute fracture or subluxation, CT head no acute intracranial abnormality, CT pelvis multiple fractures of right sacrum, right inferior pubic rami and left pubic symphysis, no subluxation or dislocation, soft tissue swelling/blood products to the angulated right inferior pubic rami fracture, right hip prosthetic appears intact. Patient was admitted to hospital medicine for pelvic fracture with orthopedic surgery consultation.   Orthopedic surgery recommended nonoperative treatment, pain control, DVT prophylaxis, weightbearing as tolerated with a walker. Speech evaluation - continuation soft and bite sized consistency with least restrictive thin liquids. Recommend meds whole in pudding/applesauce. Patient stable for discharge, will transport Per EMS to SNF. Will follow-up with PCP at facility on admission. Significant Diagnostic Studies:   CT Head W/O Contrast    Result Date: 12/16/2022  NO PRIOR REPORT AVAILABLE Exam: CT OF THE BRAIN WITHOUT CONTRAST Clinical data: Fall, hit head, no loss of consciousness, neurologically intact. Technique: Contiguous axial images are obtained from the skull base to vertex without intravenous contrast. Reformatted/MPR images were performed. Radiation dose: CTDIvol =79.50 mGy, DLP =1956 mGy x cm. Portion of the examination is degraded from motion. Prior studies: No prior studies submitted. Findings: Diffusely mild cerebral atrophy. Minimal hypoattenuation the white from small vessel ischemic disease. No acute intracranial abnormality is present. No evidence of acute cortical infarction, hemorrhage, mass or mass effect. No hydrocephalus or abnormal extra-axial fluid collections are present. The posterior fossa is unremarkable. The skull base and calvarium are intact. The included portions of the paranasal sinuses and mastoid air cells are clear. Mild intracranial atherosclerosis. 1. No acute intracranial abnormality. 2. Mild chronic changes the brain parenchyma from small vessel ischemic. 3. Age-appropriate atrophic changes. Recommendation: Follow up as clinically indicated. All CT scans at this facility utilize dose modulation, iterative reconstruction, and/or weight based dosing when appropriate to reduce radiation dose to as low as reasonably achievable.  Dictated and Electronically Signed by Vee Jo MD, SA CERTIFIED at [id number] 05:55:38 PM             CT LUMBAR SPINE WO CONTRAST    Result Date: 12/16/2022  NO PRIOR REPORT AVAILABLE Exam: CT OF THE LUMBAR SPINE WITHOUT INTRAVENOUS CONTRAST Clinical data: Pelvis fracture pain across lower back and pelvis, rule out lumbar spine involvement. Technique: Spiral axial CT images through the lumbar spine were acquired without contrast, reconstructed in axial and sagittal projections and imaged using soft tissue and bone algorithms. Reformatted/MPR images were performed. Radiation dose: CTDIvol =79.5 mGy, DLP =1956 mGy x cm. Limitations: None. Prior studies: No prior studies submitted. Findings:Reverse S-shaped scoliosis. Vertebral body heights are maintained. Moderate to marked multilevel intervertebral disc height loss. Endplate sclerotic changes. Anterior and lateral osteophytes. Facet arthropathy. No facet dislocation. Atherosclerosis of the aorta. No aneurysm. Retroperitoneal soft tissues are unremarkable. There isgrossly unremarkablealignment without acute fracture or subluxation. Inter-vertebral disc spaces: L1-L2: Mild disc bulge. Mild to moderate neural foraminal stenosis. L2-L3: Mild disc bulge. Mild to moderate neural foraminal stenosis. L3-L4: Mild annular disc bulge. Mild to moderate neural foraminal stenosis. L4-L5: Circumferential disc bulge. Partial calcification of the disc. Moderate neural foraminal stenosis. L5-S1: Annular disc bulge measures 3 mm. Bilateral foraminal components. Moderate neural foraminal stenosis. Soft tissues are grossly unremarkable. 1. Multilevel lumbar spondylosis which is moderate to marked in severity. Multilevel mild to moderate neural foraminal stenosis. No severe bony canal stenosis. 2. No acute abnormality. 3. Retroperitoneal soft tissues are unremarkable. Recommendation: Follow up as clinically indicated. All CT scans at this facility utilize dose modulation, iterative reconstruction, and/or weight based dosing when appropriate to reduce radiation dose to as low as reasonably achievable.  Dictated and Electronically Signed by Laila Ma MD, SA CERTIFIED at [id number] 05:46:42 PM             CT PELVIS WO CONTRAST Additional Contrast? None    Result Date: 12/16/2022  NO PRIOR REPORT AVAILABLE Exam: CT OF THE BONY PELVIS WITHOUT CONTRAST - MULTIPLANAR CT RECONSTRUCTION Clinical data: Fall. Questionable pelvis ramus fracture on the right. Technique: Spiral axial CT images through the pelvis were acquired without contrast, reconstructed in multiple projections and imaged using soft tissue and bone algorithms. Reformatted/MPR images were performed. Radiation Dose: CTDIvol = 79.50 mGy, DLP =  1956 mGy x cm. Limitations: None. Prior studies: No prior studies submitted. Findings: Osseous structures: Decrease in bone mineralization. Right-sided surgical hardware appears in position. Acute fractures anteriorly laterally involving the right aspect of the sacrum. This is adjacent to the sacroiliac joint. Second fracture slightly inferiorly involving the right sacrum anteriorly. Fracture line extends to the right sacroiliac joint. A complex angulated fracture of the right inferior pubic rami. Nondisplaced fracture at the anterior left pubic symphysis. No diastasis. No subluxation or dislocation. 9 Soft tissues: No CT evidence of soft tissue mass. Focal asymmetric soft tissue swelling/blood products adjacent to the angulated right inferior pubic rami fracture. Limited CT evaluation of the muscles and tendons are gross unremarkable. 1. Multiple fractures of the right sacrum, right inferior pubic rami and left pubic symphysis. 2. No subluxation or dislocation. 3. Soft tissue swelling/blood products adjacent to the angulated right inferior pubic rami fracture. 4. Right hip prosthesis appears intact. Recommendation: Follow up as clinically indicated. All CT scans at this facility utilize dose modulation, iterative reconstruction, and/or weight based dosing when appropriate to reduce radiation dose to as low as reasonably achievable.  Dictated and Electronically Signed by Miroslava Borges MD, MQSA CERTIFIED at [id number] 05:52:09 PM             XR CHEST PORTABLE    Result Date: 12/16/2022  CLINICAL HISTORY: Fall, right hip injury TECHNIQUE: Single AP view of the chest COMPARISON: CXR from 9/23/2022 FINDINGS: Lines and tubes: Left chest pacemaker. Cardiomediastinal: Normal size and configuration of the cardiomediastinal silhouette. No pneumomediastinum. Calcific atherosclerosis of the aortic arch. Lungs and pleura: The lungs are grossly unremarkable. No pleural effusion. No pneumothorax. Musculoskeletal: No acute osseous abnormalities. Osseous degenerative changes. Other: None. No acute cardiopulmonary process identified. CTA PULMONARY W CONTRAST    Result Date: 12/17/2022  Exam: CTA OF THE CHEST WITH CONTRAST Clinical data: Elevated D-dimer. Technique: Axial CT angiography images through the lungs were acquired with contrast and imaged using soft tissue and lung algorithms. Coronal, sagittal, and 3D volume reconstructions were performed. Reformatted/3D-MPR images were performed. Radiation dose: CTDIvol =39.26 mGy, DLP =394 mGy x cm. Contrast: Isovue-370. Amount: 70 mL. Prior studies: Radiographs of the chest dated 09/23/2022. Findings: Lungs:  Atelectasis and pneumonia is at the lung bases with traction bronchiectasis. Paraseptal emphysema. Calcified granuloma right upper lobe. No pulmonary mass identified. No pleural effusions identified. No pneumothorax. The airway is clear. Soft Tissues: No mediastinal, axillary or supraclavicular adenopathy isidentified. Vascular: Left sided pacemaker. No filling defect within the pulmonary arteries to the segmental branch level. Unremarkable aorta. Grossly unremarkable sized heart. Atherosclerosis of the aorta and branches. No definite abnormality seen on 3D reformatted images. Bony structures: No acute or destructive abnormality. Levoscoliosis of the thoracolumbar junction. Upper Abdomen:  Tiny stone left kidney without hydronephrosis.   Limited visualization of the solid upper abdominal organs is grossly unremarkable. 1. Atelectasis and pneumonia is at the lung bases with traction bronchiectasis. Paraseptal emphysema. Calcified granuloma right upper lobe. 2. Atherosclerosis of the aorta and branches. 3. Tiny stone left kidney without hydronephrosis. Recommendation: Follow up as clinically indicated. All CT scans at this facility utilize dose modulation, iterative reconstruction, and/or weight based dosing when appropriate to reduce radiation dose to as low as reasonably achievable. Dictated and Electronically Signed by Shonda Campos MD at 17-Dec-2022 03:46:06 PM             XR HIP 2-3 VW W PELVIS RIGHT    Result Date: 12/16/2022  CLINICAL HISTORY: Fall, right hip pain TECHNIQUE: 4 views of the pelvis and right hip COMPARISON: None    The bones are diffusely demineralized. Question nondisplaced right inferior pubic ramus fracture and minimally displaced left superior pubic ramus fracture. Intramedullary sudhir is seen in the right femur. The hardware appears intact. A CT showed be performed for further characterization. Vascular calcifications. Pertinent Labs:     CBC:   Recent Labs     12/21/22  1024   WBC 6.1   HGB 11.6*        BMP:    Recent Labs     12/21/22  1024      K 3.5   *   CO2 22   BUN 27*   CREATININE 0.5   GLUCOSE 116*     INR: No results for input(s): INR in the last 72 hours. Physical Exam:  Vital Signs: BP (!) 147/84   Pulse (!) 114   Temp 98.4 °F (36.9 °C) (Temporal)   Resp 16   Ht 5' 2\" (1.575 m)   Wt 100 lb 1 oz (45.4 kg)   SpO2 97%   BMI 18.30 kg/m²       Physical Exam  Vitals and nursing note reviewed. Constitutional:       General: She is not in acute distress. Appearance: Normal appearance. She is not ill-appearing. HENT:      Head: Normocephalic. Nose: Nose normal.      Mouth/Throat:      Mouth: Mucous membranes are moist.   Cardiovascular:      Rate and Rhythm: Normal rate and regular rhythm. Pulses: Normal pulses. Heart sounds: Normal heart sounds. Pulmonary:      Effort: Pulmonary effort is normal.      Breath sounds: Normal breath sounds. Abdominal:      General: Bowel sounds are normal. There is no distension. Palpations: Abdomen is soft. Tenderness: There is no abdominal tenderness. Musculoskeletal:         General: Normal range of motion. Cervical back: Normal range of motion. Skin:     General: Skin is warm and dry. Neurological:      Mental Status: She is alert. Mental status is at baseline. Psychiatric:         Mood and Affect: Mood normal.         Behavior: Behavior normal.       Discharge Medications:         Medication List        START taking these medications      acetaminophen 500 MG tablet  Commonly known as: TYLENOL  Take 2 tablets by mouth every 6 hours as needed for Pain     naproxen 500 MG tablet  Commonly known as: NAPROSYN  Take 1 tablet by mouth 2 times daily as needed for Pain     traMADol 50 MG tablet  Commonly known as: Ultram  Take 1 tablet by mouth every 6 hours as needed for Pain for up to 3 days. Take lowest dose possible to manage pain Max Daily Amount: 200 mg            CONTINUE taking these medications      apixaban 2.5 MG Tabs tablet  Commonly known as: ELIQUIS     isosorbide mononitrate 30 MG extended release tablet  Commonly known as: IMDUR  Take 1 tablet by mouth daily     levothyroxine 50 MCG tablet  Commonly known as: SYNTHROID     lisinopril 40 MG tablet  Commonly known as: PRINIVIL;ZESTRIL     metoprolol succinate 25 MG extended release tablet  Commonly known as: TOPROL XL     mirtazapine 7.5 MG tablet  Commonly known as: REMERON     nitroGLYCERIN 0.4 MG SL tablet  Commonly known as: NITROSTAT  Place 1 tablet under the tongue every 5 minutes as needed for Chest pain up to max of 3 total doses. If no relief after 1 dose, call 911.      vitamin D 25 MCG (1000 UT) Tabs tablet  Commonly known as: CHOLECALCIFEROL            STOP taking these medications      UNABLE TO FIND               Where to Get Your Medications        You can get these medications from any pharmacy    Bring a paper prescription for each of these medications  traMADol 50 MG tablet       Information about where to get these medications is not yet available    Ask your nurse or doctor about these medications  acetaminophen 500 MG tablet  naproxen 500 MG tablet         Discharge Instructions: Follow up with Ines Traore in 7 days. Take medications as directed. Resume activity as tolerated. Diet: ADULT ORAL NUTRITION SUPPLEMENT; Breakfast, Lunch, Dinner; Standard High Calorie/High Protein Oral Supplement  ADULT DIET; Dysphagia - Soft and Bite Sized     Disposition: Patient is Stableand will be discharged to [address] [place] [address]. Time spent on discharge  35   minutes spent in assessing patient, reviewing medications, discussion with nursing, confirming safe discharge plan and preparation of discharge summary.     Signed:  YOVANA Marx CNP, 12/22/2022 9:15 AM

## 2022-12-23 PROBLEM — Z51.5 PALLIATIVE CARE PATIENT: Status: ACTIVE | Noted: 2022-12-23

## 2022-12-23 PROBLEM — U07.1 COVID: Status: ACTIVE | Noted: 2022-12-22

## 2022-12-23 LAB
ALBUMIN SERPL-MCNC: 3.3 G/DL (ref 3.5–5.2)
ALP BLD-CCNC: 71 U/L (ref 35–104)
ALT SERPL-CCNC: 33 U/L (ref 5–33)
ANION GAP SERPL CALCULATED.3IONS-SCNC: 10 MMOL/L (ref 7–19)
AST SERPL-CCNC: 38 U/L (ref 5–32)
BILIRUB SERPL-MCNC: 1.2 MG/DL (ref 0.2–1.2)
BUN BLDV-MCNC: 27 MG/DL (ref 8–23)
CALCIUM SERPL-MCNC: 9.3 MG/DL (ref 8.8–10.2)
CHLORIDE BLD-SCNC: 103 MMOL/L (ref 98–111)
CO2: 30 MMOL/L (ref 22–29)
CREAT SERPL-MCNC: 0.8 MG/DL (ref 0.5–0.9)
GFR SERPL CREATININE-BSD FRML MDRD: >60 ML/MIN/{1.73_M2}
GLUCOSE BLD-MCNC: 100 MG/DL (ref 74–109)
MAGNESIUM: 1.7 MG/DL (ref 1.7–2.3)
POTASSIUM REFLEX MAGNESIUM: 3.3 MMOL/L (ref 3.5–5)
SODIUM BLD-SCNC: 143 MMOL/L (ref 136–145)
TOTAL PROTEIN: 6.1 G/DL (ref 6.6–8.7)

## 2022-12-23 PROCEDURE — 6370000000 HC RX 637 (ALT 250 FOR IP)

## 2022-12-23 PROCEDURE — 2580000003 HC RX 258

## 2022-12-23 PROCEDURE — 1210000000 HC MED SURG R&B

## 2022-12-23 PROCEDURE — 80053 COMPREHEN METABOLIC PANEL: CPT

## 2022-12-23 PROCEDURE — 6370000000 HC RX 637 (ALT 250 FOR IP): Performed by: HOSPITALIST

## 2022-12-23 PROCEDURE — 36415 COLL VENOUS BLD VENIPUNCTURE: CPT

## 2022-12-23 PROCEDURE — 94760 N-INVAS EAR/PLS OXIMETRY 1: CPT

## 2022-12-23 PROCEDURE — 97116 GAIT TRAINING THERAPY: CPT

## 2022-12-23 PROCEDURE — 83735 ASSAY OF MAGNESIUM: CPT

## 2022-12-23 PROCEDURE — 6370000000 HC RX 637 (ALT 250 FOR IP): Performed by: STUDENT IN AN ORGANIZED HEALTH CARE EDUCATION/TRAINING PROGRAM

## 2022-12-23 RX ORDER — OXYCODONE HYDROCHLORIDE AND ACETAMINOPHEN 5; 325 MG/1; MG/1
1 TABLET ORAL EVERY 6 HOURS PRN
Status: DISCONTINUED | OUTPATIENT
Start: 2022-12-23 | End: 2022-12-25

## 2022-12-23 RX ORDER — DOCUSATE SODIUM 100 MG/1
100 CAPSULE, LIQUID FILLED ORAL DAILY
Status: DISCONTINUED | OUTPATIENT
Start: 2022-12-23 | End: 2022-12-26 | Stop reason: HOSPADM

## 2022-12-23 RX ADMIN — DOCUSATE SODIUM 100 MG: 100 CAPSULE, LIQUID FILLED ORAL at 13:05

## 2022-12-23 RX ADMIN — Medication 1 TABLET: at 09:27

## 2022-12-23 RX ADMIN — APIXABAN 2.5 MG: 2.5 TABLET, FILM COATED ORAL at 21:33

## 2022-12-23 RX ADMIN — METOPROLOL SUCCINATE 50 MG: 50 TABLET, EXTENDED RELEASE ORAL at 09:39

## 2022-12-23 RX ADMIN — LISINOPRIL 40 MG: 20 TABLET ORAL at 09:37

## 2022-12-23 RX ADMIN — OXYCODONE HYDROCHLORIDE AND ACETAMINOPHEN 1 TABLET: 5; 325 TABLET ORAL at 13:05

## 2022-12-23 RX ADMIN — SODIUM CHLORIDE, PRESERVATIVE FREE 10 ML: 5 INJECTION INTRAVENOUS at 09:54

## 2022-12-23 RX ADMIN — Medication 1000 UNITS: at 09:27

## 2022-12-23 RX ADMIN — SODIUM CHLORIDE, PRESERVATIVE FREE 10 ML: 5 INJECTION INTRAVENOUS at 21:34

## 2022-12-23 RX ADMIN — MIRTAZAPINE 7.5 MG: 7.5 TABLET ORAL at 21:34

## 2022-12-23 RX ADMIN — ISOSORBIDE MONONITRATE 30 MG: 30 TABLET, EXTENDED RELEASE ORAL at 09:26

## 2022-12-23 RX ADMIN — TRAMADOL HYDROCHLORIDE 50 MG: 50 TABLET, COATED ORAL at 09:57

## 2022-12-23 RX ADMIN — LEVOTHYROXINE SODIUM 50 MCG: 50 TABLET ORAL at 06:19

## 2022-12-23 RX ADMIN — APIXABAN 2.5 MG: 2.5 TABLET, FILM COATED ORAL at 09:27

## 2022-12-23 ASSESSMENT — PAIN DESCRIPTION - ORIENTATION
ORIENTATION: RIGHT
ORIENTATION: MID
ORIENTATION: LOWER

## 2022-12-23 ASSESSMENT — ENCOUNTER SYMPTOMS
NAUSEA: 0
SORE THROAT: 0
ABDOMINAL PAIN: 0
SHORTNESS OF BREATH: 0
CONSTIPATION: 0
DIARRHEA: 0
TROUBLE SWALLOWING: 0
COUGH: 0
ABDOMINAL DISTENTION: 0
SINUS PAIN: 0
WHEEZING: 0
VOMITING: 0
BLOOD IN STOOL: 0

## 2022-12-23 ASSESSMENT — PAIN DESCRIPTION - LOCATION
LOCATION: PELVIS
LOCATION: BACK
LOCATION: BACK

## 2022-12-23 ASSESSMENT — PAIN SCALES - GENERAL
PAINLEVEL_OUTOF10: 0
PAINLEVEL_OUTOF10: 7
PAINLEVEL_OUTOF10: 8
PAINLEVEL_OUTOF10: 8

## 2022-12-23 ASSESSMENT — PAIN DESCRIPTION - DESCRIPTORS
DESCRIPTORS: ACHING

## 2022-12-23 ASSESSMENT — PAIN SCALES - WONG BAKER
WONGBAKER_NUMERICALRESPONSE: 0
WONGBAKER_NUMERICALRESPONSE: 0

## 2022-12-23 ASSESSMENT — PAIN - FUNCTIONAL ASSESSMENT: PAIN_FUNCTIONAL_ASSESSMENT: PREVENTS OR INTERFERES SOME ACTIVE ACTIVITIES AND ADLS

## 2022-12-23 ASSESSMENT — PAIN DESCRIPTION - FREQUENCY: FREQUENCY: INTERMITTENT

## 2022-12-23 ASSESSMENT — PAIN DESCRIPTION - PAIN TYPE: TYPE: ACUTE PAIN

## 2022-12-23 NOTE — PROGRESS NOTES
Physical Therapy  Name: Nurys Link  MRN:  [id number]  Date of service:  12/23/2022 12/23/22 1048   Lower Extremity Weight Bearing Restrictions   Right Lower Extremity Weight Bearing Weight Bearing As Tolerated   Left Lower Extremity Weight Bearing Weight Bearing As Tolerated   Subjective   Subjective Pt agreed to therapy. Pain Assessment   Pain Assessment 0-10   Pain Level 7  (7 after activity; 1 before)   Pain Location Pelvis   Pain Orientation Right   Pain Descriptors Aching   Functional Pain Assessment Prevents or interferes some active activities and ADLs   Pain Type Acute pain   Pain Frequency Intermittent   Non-Pharmaceutical Pain Intervention(s) Ambulation/Increased Activity;Repositioned; Rest   Response to Pain Intervention Patient satisfied   Bed Mobility   Supine to Sit Maximal assistance   Transfers   Sit to Stand Minimal Assistance   Stand to Sit Minimal Assistance   Ambulation   WB Status WBAT   Ambulation   Device Rolling Walker   Assistance Minimal assistance   Gait Deviations Slow Zahraa;Decreased step length;Decreased step height   Distance 15'   Short Term Goals   Time Frame for Short Term Goals 2 weeks   Short Term Goal 1 Require no more than minimal assist for bed mobility   Short Term Goal 2 Require no more than minimal assist for sit to stand transfers   Short Term Goal 3 Ambulate 30 feet with assistive device and minimum assist of 1   Conditions Requiring Skilled Therapeutic Intervention   Body Structures, Functions, Activity Limitations Requiring Skilled Therapeutic Intervention Decreased functional mobility    Assessment Pt able to amb short distance, antalgic and slow pace. Required increased assist for bed mobility. Pt reports increased pain with mobility but did not want to request pain meds at this time.  Up to chair and set up for breakfast.   Activity Tolerance   Activity Tolerance Patient limited by pain   PT Plan of Care   Friday X   Safety Devices   Type of Devices Call light within reach; Chair alarm in place; Left in chair         Electronically signed by Antoine Holman PTA on 12/23/2022 at 10:54 AM

## 2022-12-23 NOTE — PLAN OF CARE
Problem: Discharge Planning  Goal: Discharge to home or other facility with appropriate resources  12/22/2022 2322 by Ravinder Olivier RN  Outcome: Progressing  12/22/2022 1809 by Naty Cook RN  Outcome: Progressing     Problem: Skin/Tissue Integrity  Goal: Absence of new skin breakdown  Description: 1. Monitor for areas of redness and/or skin breakdown  2. Assess vascular access sites hourly  3. Every 4-6 hours minimum:  Change oxygen saturation probe site  4. Every 4-6 hours:  If on nasal continuous positive airway pressure, respiratory therapy assess nares and determine need for appliance change or resting period. 12/22/2022 2322 by Ravinder Olivier RN  Outcome: Progressing  12/22/2022 1809 by Naty Cook RN  Outcome: Progressing     Problem: ABCDS Injury Assessment  Goal: Absence of physical injury  12/22/2022 2322 by Ravinder Olivier RN  Outcome: Progressing  12/22/2022 1809 by Naty Cook RN  Outcome: Progressing     Problem: Safety - Adult  Goal: Free from fall injury  12/22/2022 2322 by Ravinder Olivier RN  Outcome: Progressing  12/22/2022 1809 by Naty Cook RN  Outcome: Progressing     Problem: Confusion  Goal: Confusion, delirium, dementia, or psychosis is improved or at baseline  Description: INTERVENTIONS:  1. Assess for possible contributors to thought disturbance, including medications, impaired vision or hearing, underlying metabolic abnormalities, dehydration, psychiatric diagnoses, and notify attending LIP  2. [place] high risk fall precautions, as indicated  3. Provide frequent short contacts to provide reality reorientation, refocusing and direction  4. Decrease environmental stimuli, including noise as appropriate  5. Monitor and intervene to maintain adequate nutrition, hydration, elimination, sleep and activity  6. If unable to ensure safety without constant attention obtain sitter and review sitter guidelines with assigned personnel  7.  Initiate Psychosocial CNS and Spiritual Care consult, as indicated  12/22/2022 2322 by Xochilt Mina RN  Outcome: Progressing  12/22/2022 1809 by Reagan Verma RN  Outcome: Progressing     Problem: Safety - Medical Restraint  Goal: Remains free of injury from restraints (Restraint for Interference with Medical Device)  Description: INTERVENTIONS:  1. Determine that other, less restrictive measures have been tried or would not be effective before applying the restraint  2. Evaluate the patient's condition at the time of restraint application  3. Inform patient/family regarding the reason for restraint  4.  Q2H: Monitor safety, psychosocial status, comfort, nutrition and hydration  12/22/2022 2322 by Xochilt Mina RN  Outcome: Progressing  12/22/2022 1809 by Reagan Verma RN  Outcome: Progressing     Problem: Nutrition Deficit:  Goal: Optimize nutritional status  12/22/2022 2322 by Xochilt Mina RN  Outcome: Progressing  12/22/2022 1809 by Reagan Verma RN  Outcome: Progressing     Problem: Neurosensory - Adult  Goal: Achieves stable or improved neurological status  12/22/2022 2322 by Xochilt Mina RN  Outcome: Progressing  12/22/2022 1809 by Reagan Verma RN  Outcome: Progressing  Goal: Absence of seizures  12/22/2022 2322 by Xochilt Mina RN  Outcome: Progressing  12/22/2022 1809 by Reagan Verma RN  Outcome: Progressing  Goal: Remains free of injury related to seizures activity  12/22/2022 2322 by Xochilt Mina RN  Outcome: Progressing  12/22/2022 1809 by Reagan Verma RN  Outcome: Progressing  Goal: Achieves maximal functionality and self care  12/22/2022 2322 by Xochilt Mina RN  Outcome: Progressing  12/22/2022 1809 by Reagan Verma RN  Outcome: Progressing     Problem: Cardiovascular - Adult  Goal: Maintains optimal cardiac output and hemodynamic stability  12/22/2022 2322 by Xochilt Mina RN  Outcome: Progressing  12/22/2022 1809 by Reagan Verma RN  Outcome: Progressing  Goal: Absence of cardiac dysrhythmias or at baseline  12/22/2022 2322 by Xochilt Mina RN  Outcome: Progressing  12/22/2022 1809 by Reagan Verma RN  Outcome: Progressing     Problem: Skin/Tissue Integrity - Adult  Goal: Skin integrity remains intact  12/22/2022 2322 by Xochilt Mina RN  Outcome: Progressing  12/22/2022 1809 by Reagan Verma RN  Outcome: Progressing  Goal: Incisions, wounds, or drain sites healing without S/S of infection  12/22/2022 2322 by Xochilt Mina RN  Outcome: Progressing  12/22/2022 1809 by Reagan Verma RN  Outcome: Progressing  Goal: Oral mucous membranes remain intact  12/22/2022 2322 by Xochilt Mina RN  Outcome: Progressing  12/22/2022 1809 by Reagan Verma RN  Outcome: Progressing     Problem: Musculoskeletal - Adult  Goal: Return mobility to safest level of function  12/22/2022 2322 by Xochilt Mina RN  Outcome: Progressing  12/22/2022 1809 by Reagan Verma RN  Outcome: Progressing  Goal: Maintain proper alignment of affected body part  12/22/2022 2322 by Xochilt Mina RN  Outcome: Progressing  12/22/2022 1809 by Reagan Verma RN  Outcome: Progressing  Goal: Return ADL status to a safe level of function  12/22/2022 2322 by Xochilt Mina RN  Outcome: Progressing  12/22/2022 1809 by Reagan Verma RN  Outcome: Progressing     Problem: Genitourinary - Adult  Goal: Absence of urinary retention  12/22/2022 2322 by Xochilt Mina RN  Outcome: Progressing  12/22/2022 1809 by Reagan Verma RN  Outcome: Progressing  Goal: Urinary catheter remains patent  12/22/2022 2322 by Xochilt Mina RN  Outcome: Progressing  12/22/2022 1809 by Reagan Verma RN  Outcome: Progressing     Problem: Pain  Goal: Verbalizes/displays adequate comfort level or baseline comfort level  12/22/2022 2322 by Xochilt Mina RN  Outcome: Progressing  12/22/2022 1809 by Reagan Verma RN  Outcome: Progressing

## 2022-12-23 NOTE — CARE COORDINATION
12/23/22 1135   IMM Letter   IMM Letter given to Patient/Family/Significant other/Guardian/POA/by: Pt provided IMM letter to next of kin. IMM Letter date given: 12/23/22   IMM Letter time given: 1134   Pt got verbal consent from Pt next of kin. No questions or concerns at this time.    Electronically signed by JOVANNI Hill on 12/23/2022 at 11:40 AM

## 2022-12-23 NOTE — ACP (ADVANCE CARE PLANNING)
Advance Care Planning     Advance Care Planning Activator (Inpatient)  Conversation Note      Date of ACP Conversation: 12/23/2022     Conversation Conducted with: Patient    ACP Activator: Steve Aguilar RN        Health Care Decision Maker:     Current Designated Health Care Decision Maker:     Primary Decision Maker: Yo Barriga - [phone]        Care Preferences    Ventilation: \"If you were in your present state of health and suddenly became very ill and were unable to breathe on your own, what would your preference be about the use of a ventilator (breathing machine) if it were available to you? \"      Would the patient desire the use of ventilator (breathing machine)?: yes          Resuscitation  \"CPR works best to restart the heart when there is a sudden event, like a heart attack, in someone who is otherwise healthy. Unfortunately, CPR does not typically restart the heart for people who have serious health conditions or who are very sick. \"    \"In the event your heart stopped as a result of an underlying serious health condition, would you want attempts to be made to restart your heart (answer \"yes\" for attempt to resuscitate) or would you prefer a natural death (answer \"no\" for do not attempt to resuscitate)? \"  yes      Conversation Outcomes:  [x] ACP discussion completed

## 2022-12-23 NOTE — PROGRESS NOTES
Follow up to re-evaluate pt's pain. Visit earlier from [address] [place] [address], and pt was having pain. Pt has received new order for oxycodone, it was administered and she is resting well at this time. Will continue to follow and support,  Follow through discharge to SNF.     Electronically signed by Hemal Nettles RN on 12/23/2022 at 1:17 PM

## 2022-12-23 NOTE — PALLIATIVE CARE
Palliative Care:   Ms Talya Chau is a 80year old female  who presented  to the Emergency Department  after a fall from Home  having Right Hip pain   admitted with a fractured pelvis HX of atrival fibrillation and is maintained on eliquis Hx Hypertension unable to ambulate after fall  patient  was admitted to hospital medicine for pelvic fracture with orthopedic surgery consultation orthopedic surgery recommended non operative treatment and pain control DVT prophylaxis weight bearing as tolerated with a walker. Past Medical History:        Past Medical History:   Diagnosis Date    Atrial fibrillation [facility])     Cardiac pacemaker     Hypertension     Hypothyroidism     Palliative care patient 12/23/2022       Advance Directives:       CAMMIE Boucher  [grandson]  Pain/Other Symptoms:    patient  having  hip /leg pain flacc scale 9/10  Tramadol for pain requesting  primary nurse  to address pain               Psychological/Spiritual:    Cheondoism              Plan:    Discharge to  [facility]  12/26      Patient  Goal  Patient will go to [facility] for rehab.                  Electronically signed by Yue Londono RN on 12/23/2022 at 11:21 AM

## 2022-12-23 NOTE — PLAN OF CARE
Problem: Discharge Planning  Goal: Discharge to home or other facility with appropriate resources  Outcome: Progressing     Problem: Skin/Tissue Integrity  Goal: Absence of new skin breakdown  Description: 1. Monitor for areas of redness and/or skin breakdown  2. Assess vascular access sites hourly  3. Every 4-6 hours minimum:  Change oxygen saturation probe site  4. Every 4-6 hours:  If on nasal continuous positive airway pressure, respiratory therapy assess nares and determine need for appliance change or resting period. Outcome: Progressing     Problem: ABCDS Injury Assessment  Goal: Absence of physical injury  Outcome: Progressing     Problem: Safety - Adult  Goal: Free from fall injury  Outcome: Progressing     Problem: Confusion  Goal: Confusion, delirium, dementia, or psychosis is improved or at baseline  Description: INTERVENTIONS:  1. Assess for possible contributors to thought disturbance, including medications, impaired vision or hearing, underlying metabolic abnormalities, dehydration, psychiatric diagnoses, and notify attending LIP  2. [place] high risk fall precautions, as indicated  3. Provide frequent short contacts to provide reality reorientation, refocusing and direction  4. Decrease environmental stimuli, including noise as appropriate  5. Monitor and intervene to maintain adequate nutrition, hydration, elimination, sleep and activity  6. If unable to ensure safety without constant attention obtain sitter and review sitter guidelines with assigned personnel  7. Initiate Psychosocial CNS and Spiritual Care consult, as indicated  Outcome: Progressing     Problem: Safety - Medical Restraint  Goal: Remains free of injury from restraints (Restraint for Interference with Medical Device)  Description: INTERVENTIONS:  1. Determine that other, less restrictive measures have been tried or would not be effective before applying the restraint  2.  Evaluate the patient's condition at the time of restraint application  3. Inform patient/family regarding the reason for restraint  4.  Q2H: Monitor safety, psychosocial status, comfort, nutrition and hydration  Outcome: Progressing     Problem: Nutrition Deficit:  Goal: Optimize nutritional status  Outcome: Progressing     Problem: Neurosensory - Adult  Goal: Achieves stable or improved neurological status  Outcome: Progressing  Goal: Absence of seizures  Outcome: Progressing  Goal: Remains free of injury related to seizures activity  Outcome: Progressing  Goal: Achieves maximal functionality and self care  Outcome: Progressing     Problem: Cardiovascular - Adult  Goal: Maintains optimal cardiac output and hemodynamic stability  Outcome: Progressing  Goal: Absence of cardiac dysrhythmias or at baseline  Outcome: Progressing     Problem: Skin/Tissue Integrity - Adult  Goal: Skin integrity remains intact  Outcome: Progressing  Goal: Incisions, wounds, or drain sites healing without S/S of infection  Outcome: Progressing  Goal: Oral mucous membranes remain intact  Outcome: Progressing     Problem: Musculoskeletal - Adult  Goal: Return mobility to safest level of function  Outcome: Progressing  Goal: Maintain proper alignment of affected body part  Outcome: Progressing  Goal: Return ADL status to a safe level of function  Outcome: Progressing     Problem: Genitourinary - Adult  Goal: Absence of urinary retention  Outcome: Progressing  Goal: Urinary catheter remains patent  Outcome: Progressing     Problem: Pain  Goal: Verbalizes/displays adequate comfort level or baseline comfort level  Outcome: Progressing

## 2022-12-24 LAB
ANION GAP SERPL CALCULATED.3IONS-SCNC: 9 MMOL/L (ref 7–19)
BUN BLDV-MCNC: 55 MG/DL (ref 8–23)
CALCIUM SERPL-MCNC: 9.2 MG/DL (ref 8.8–10.2)
CHLORIDE BLD-SCNC: 101 MMOL/L (ref 98–111)
CO2: 32 MMOL/L (ref 22–29)
CREAT SERPL-MCNC: 0.9 MG/DL (ref 0.5–0.9)
GFR SERPL CREATININE-BSD FRML MDRD: >60 ML/MIN/{1.73_M2}
GLUCOSE BLD-MCNC: 129 MG/DL (ref 74–109)
GLUCOSE BLD-MCNC: 146 MG/DL (ref 70–99)
GLUCOSE BLD-MCNC: 215 MG/DL (ref 70–99)
PERFORMED ON: ABNORMAL
PERFORMED ON: ABNORMAL
POTASSIUM REFLEX MAGNESIUM: 3.9 MMOL/L (ref 3.5–5)
SODIUM BLD-SCNC: 142 MMOL/L (ref 136–145)

## 2022-12-24 PROCEDURE — 80048 BASIC METABOLIC PNL TOTAL CA: CPT

## 2022-12-24 PROCEDURE — 6370000000 HC RX 637 (ALT 250 FOR IP)

## 2022-12-24 PROCEDURE — 36415 COLL VENOUS BLD VENIPUNCTURE: CPT

## 2022-12-24 PROCEDURE — 2580000003 HC RX 258

## 2022-12-24 PROCEDURE — 51798 US URINE CAPACITY MEASURE: CPT

## 2022-12-24 PROCEDURE — 94760 N-INVAS EAR/PLS OXIMETRY 1: CPT

## 2022-12-24 PROCEDURE — 6370000000 HC RX 637 (ALT 250 FOR IP): Performed by: HOSPITALIST

## 2022-12-24 PROCEDURE — 82947 ASSAY GLUCOSE BLOOD QUANT: CPT

## 2022-12-24 PROCEDURE — 1210000000 HC MED SURG R&B

## 2022-12-24 PROCEDURE — 6370000000 HC RX 637 (ALT 250 FOR IP): Performed by: STUDENT IN AN ORGANIZED HEALTH CARE EDUCATION/TRAINING PROGRAM

## 2022-12-24 RX ORDER — SODIUM CHLORIDE, SODIUM LACTATE, POTASSIUM CHLORIDE, AND CALCIUM CHLORIDE .6; .31; .03; .02 G/100ML; G/100ML; G/100ML; G/100ML
500 INJECTION, SOLUTION INTRAVENOUS ONCE
Status: COMPLETED | OUTPATIENT
Start: 2022-12-24 | End: 2022-12-24

## 2022-12-24 RX ADMIN — OXYCODONE HYDROCHLORIDE AND ACETAMINOPHEN 1 TABLET: 5; 325 TABLET ORAL at 10:41

## 2022-12-24 RX ADMIN — TRAMADOL HYDROCHLORIDE 50 MG: 50 TABLET, COATED ORAL at 12:38

## 2022-12-24 RX ADMIN — APIXABAN 2.5 MG: 2.5 TABLET, FILM COATED ORAL at 20:04

## 2022-12-24 RX ADMIN — Medication 1 TABLET: at 10:42

## 2022-12-24 RX ADMIN — LEVOTHYROXINE SODIUM 50 MCG: 50 TABLET ORAL at 05:39

## 2022-12-24 RX ADMIN — APIXABAN 2.5 MG: 2.5 TABLET, FILM COATED ORAL at 10:42

## 2022-12-24 RX ADMIN — SODIUM CHLORIDE, PRESERVATIVE FREE 10 ML: 5 INJECTION INTRAVENOUS at 10:43

## 2022-12-24 RX ADMIN — LISINOPRIL 40 MG: 20 TABLET ORAL at 10:42

## 2022-12-24 RX ADMIN — METOPROLOL SUCCINATE 50 MG: 50 TABLET, EXTENDED RELEASE ORAL at 10:42

## 2022-12-24 RX ADMIN — SODIUM CHLORIDE, POTASSIUM CHLORIDE, SODIUM LACTATE AND CALCIUM CHLORIDE 500 ML: 600; 310; 30; 20 INJECTION, SOLUTION INTRAVENOUS at 15:09

## 2022-12-24 RX ADMIN — MIRTAZAPINE 7.5 MG: 7.5 TABLET ORAL at 20:04

## 2022-12-24 RX ADMIN — SODIUM CHLORIDE, POTASSIUM CHLORIDE, SODIUM LACTATE AND CALCIUM CHLORIDE 500 ML: 600; 310; 30; 20 INJECTION, SOLUTION INTRAVENOUS at 16:45

## 2022-12-24 RX ADMIN — Medication 1000 UNITS: at 10:42

## 2022-12-24 RX ADMIN — ISOSORBIDE MONONITRATE 30 MG: 30 TABLET, EXTENDED RELEASE ORAL at 10:42

## 2022-12-24 RX ADMIN — SODIUM CHLORIDE, PRESERVATIVE FREE 10 ML: 5 INJECTION INTRAVENOUS at 20:06

## 2022-12-24 RX ADMIN — DOCUSATE SODIUM 100 MG: 100 CAPSULE, LIQUID FILLED ORAL at 10:42

## 2022-12-24 ASSESSMENT — ENCOUNTER SYMPTOMS
SINUS PAIN: 0
COUGH: 0
VOMITING: 0
SHORTNESS OF BREATH: 0
BLOOD IN STOOL: 0
NAUSEA: 0
ABDOMINAL DISTENTION: 0
WHEEZING: 0
CONSTIPATION: 0
TROUBLE SWALLOWING: 0
SORE THROAT: 0
ABDOMINAL PAIN: 0
DIARRHEA: 0

## 2022-12-24 ASSESSMENT — PAIN DESCRIPTION - ORIENTATION
ORIENTATION: RIGHT;POSTERIOR
ORIENTATION: RIGHT
ORIENTATION: RIGHT;POSTERIOR

## 2022-12-24 ASSESSMENT — PAIN DESCRIPTION - DESCRIPTORS
DESCRIPTORS: ACHING
DESCRIPTORS: ACHING;DULL

## 2022-12-24 ASSESSMENT — PAIN DESCRIPTION - LOCATION
LOCATION: HIP;PELVIS;BACK
LOCATION: HIP;BACK;PELVIS
LOCATION: HIP

## 2022-12-24 ASSESSMENT — PAIN SCALES - GENERAL
PAINLEVEL_OUTOF10: 6
PAINLEVEL_OUTOF10: 6
PAINLEVEL_OUTOF10: 10

## 2022-12-24 NOTE — PROGRESS NOTES
[facility]      [facility] Note    Patient:  Roly Oliver  YOB: 1932  Date of Service: 12/24/2022  MRN: [id number]   Acct: [de-identified]   Primary Care Physician: Suma Sandoval  Advance Directive: Full Code  Admit Date: 12/16/2022       Hospital Day: 8    Portions of this note have been copied forward, however, updated to reflect the most current clinical status of this patient. CHIEF COMPLAINT Fall     SUBJECTIVE:  Ms. Adrienne Dominguez was resting in bed this morning. Reported tolerable Left hip pain. Denies SOB or chest pain at this time. More talkative this AM.        [address] COURSE:   The patient is a 80 y.o. female with PMH of HTN, hypothyroidism, AF who presented to [facility] ED with complaints of fall. Stated she became dizzy, lightheadedness and LOC on day of admission. Reported landing on her right hip and head injury. Denied loss of bowel or bladder. Stated she was unable to ambulate after fall. Received IV fentanyl and IV Ativan due to increased pain in ED. Upon arrival to floor rapid response was called due to oxygen saturation in the 60s. Frank Bradford NP and Dr. Kelsie Macias present and 0.2 mg Narcan given, patient arousable to speech, and answering questions appropriately. Continuous pulse oximetry and monitor on telemetry on medical floor at this time. Work-up in ER CXR no acute cardiopulmonary process, CT lumbar spine no acute fracture or subluxation, CT head no acute intracranial abnormality, CT pelvis multiple fractures of right sacrum, right inferior pubic rami and left pubic symphysis, no subluxation or dislocation, soft tissue swelling/blood products to the angulated right inferior pubic rami fracture, right hip prosthetic appears intact. Patient was admitted to hospital medicine for pelvic fracture with orthopedic surgery consultation.   Orthopedic surgery recommended nonoperative treatment, pain control, DVT prophylaxis, weightbearing as tolerated with a walker. 12/18/2022   Ortho continues to follow, non-operative management. Case management assisting SNF placement in [facility]. Continue to monitor and trend daily labs. Potassium replacement for 3.5, will recheck in AM.    12/19/2022  Pending referral to [facility] SNF. Calcium 8.7, Oscal initiated, will follow-up with PCP on discharge. PT continue to management treatment. 12/20/2022   No change in patient status, continues to wait for SNF placement. Speech evaluation for swallowing study. Will change to Dysphagia, soft and bite size, no hard foods. 12/21/2022  Patient has placement, will discharge in early AM for EMS to transport patient to facility. No change in patients status, was up in chair with assist.  Speech evaluation - continuation soft and bite sized consistency with least restrictive thin liquids. Recommend meds whole in pudding/applesauce. 12/22/2022   Patient positive PCR at discharge, unable to discharge to SNF at this time. Will transfer patient to 4th floor, Covid precautions. 12/23/2022  Patient no Covid symptoms noted. Will continue to monitor. Has SNF placement, possible for Monday. Potassium replacement, will need follow-up when discharged for facility. 12/24/2022  Placement for Monday to SNF, Continue to monitor Covid status, asymptomatic currently. Review of Systems   Constitutional:  Negative for chills, diaphoresis, fatigue and fever. HENT:  Negative for congestion, ear pain, sinus pain, sore throat and trouble swallowing. Eyes:  Negative for visual disturbance. Respiratory:  Negative for cough, shortness of breath and wheezing. Cardiovascular:  Negative for chest pain, palpitations and leg swelling. Gastrointestinal:  Negative for abdominal distention, abdominal pain, blood in stool, constipation, diarrhea, nausea and vomiting. Endocrine: Negative for cold intolerance and heat intolerance.    Genitourinary:  Negative for difficulty urinating, flank pain, frequency and urgency. Musculoskeletal:  Positive for arthralgias. Negative for myalgias. Left hip pain    Skin: Negative. Neurological:  Negative for dizziness, syncope, weakness, light-headedness, numbness and headaches. Hematological:  Does not bruise/bleed easily. Psychiatric/Behavioral:  Negative for agitation, confusion and dysphoric mood. Objective:   VITALS:  /81   Pulse 70   Temp 97.2 °F (36.2 °C) (Temporal)   Resp 16   Ht 5' 2\" (1.575 m)   Wt 100 lb 1 oz (45.4 kg)   SpO2 100%   BMI 18.30 kg/m²   24HR INTAKE/OUTPUT:    Intake/Output Summary (Last 24 hours) at 12/24/2022 1242  Last data filed at 12/23/2022 2016  Gross per 24 hour   Intake 720 ml   Output 1100 ml   Net -380 ml         Physical Exam  Constitutional:       General: She is not in acute distress. Appearance: Normal appearance. She is not toxic-appearing or diaphoretic. HENT:      Head: Normocephalic and atraumatic. Right Ear: External ear normal.      Left Ear: External ear normal.      Nose: Nose normal. No congestion or rhinorrhea. Mouth/Throat:      Mouth: Mucous membranes are moist.      Pharynx: Oropharynx is clear. Eyes:      General: No scleral icterus. Extraocular Movements: Extraocular movements intact. Conjunctiva/sclera: Conjunctivae normal.   Cardiovascular:      Rate and Rhythm: Normal rate and regular rhythm. Pulses: Normal pulses. Heart sounds: Normal heart sounds. No murmur heard. No friction rub. No gallop. Pulmonary:      Effort: Pulmonary effort is normal. No respiratory distress. Breath sounds: Normal breath sounds. No wheezing, rhonchi or rales. Abdominal:      General: Abdomen is flat. Bowel sounds are normal. There is no distension. Palpations: Abdomen is soft. Tenderness: There is no abdominal tenderness. Musculoskeletal:         General: Tenderness present. No swelling. Normal range of motion. Cervical back: Normal range of motion and neck supple. Right lower leg: No edema. Left lower leg: No edema. Comments: Left hip pain   Skin:     General: Skin is warm and dry. Coloration: Skin is not jaundiced. Findings: No erythema, lesion or rash. Neurological:      General: No focal deficit present. Mental Status: She is alert and oriented to person, place, and time. Mental status is at baseline. Cranial Nerves: No cranial nerve deficit. Sensory: No sensory deficit. Motor: No weakness. Psychiatric:         Mood and Affect: Mood normal.         Behavior: Behavior normal.         Thought Content: Thought content normal.         Judgment: Judgment normal.          Medications:      sodium chloride        docusate sodium  100 mg Oral Daily    metoprolol succinate  50 mg Oral Daily    mirtazapine  7.5 mg Oral Nightly    oyster shell calcium w/D  1 tablet Oral Daily    sodium chloride flush  5-40 mL IntraVENous 2 times per day    isosorbide mononitrate  30 mg Oral Daily    levothyroxine  50 mcg Oral Daily    lisinopril  40 mg Oral Daily    Vitamin D  1,000 Units Oral Daily    apixaban  2.5 mg Oral BID     oxyCODONE-acetaminophen, traMADol, nitroGLYCERIN, potassium chloride **OR** potassium alternative oral replacement **OR** potassium chloride, sodium chloride flush, sodium chloride, ondansetron **OR** ondansetron, polyethylene glycol, acetaminophen **OR** acetaminophen  ADULT ORAL NUTRITION SUPPLEMENT; Breakfast, Lunch, Dinner; Standard High Calorie/High Protein Oral Supplement  ADULT DIET; Dysphagia - Soft and Bite Sized;  Safety Tray; Safety Tray (Disposables)     Lab and other Data:     Recent Labs     12/21/22  1024   WBC 6.1   HGB 11.6*          Recent Labs     12/21/22  1024 12/23/22  1209    143   K 3.5 3.3*   * 103   CO2 22 30*   BUN 27* 27*   CREATININE 0.5 0.8   GLUCOSE 116* 100       Recent Labs     12/23/22  1209   AST 38*   ALT 33   BILITOT 1. 2   ALKPHOS 71         INR:   No results for input(s): INR in the last 72 hours. UA:  No results for input(s): NITRITE, COLORU, PHUR, LABCAST, WBCUA, RBCUA, MUCUS, TRICHOMONAS, YEAST, BACTERIA, CLARITYU, SPECGRAV, LEUKOCYTESUR, UROBILINOGEN, BILIRUBINUR, BLOODU, GLUCOSEU, AMORPHOUS in the last 72 hours. Invalid input(s): KETONESU    ABG:  No results for input(s): PHART, NOL8XGX, PO2ART, CDF4UIY, BEART, HGBAE, O1WIBIDF, CARBOXHGBART in the last 72 hours. RAD:     CT Head W/O Contrast  Result Date: 12/16/2022     1. No acute intracranial abnormality. 2. Mild chronic changes the brain parenchyma from small vessel ischemic. 3. Age-appropriate atrophic changes. Recommendation: Follow up as clinically indicated. All CT scans at this facility utilize dose modulation, iterative reconstruction, and/or weight based dosing when appropriate to reduce radiation dose to as low as reasonably achievable. Dictated and Electronically Signed by Torin Bauer MD, MQSA CERTIFIED at [id number] 05:55:38 PM               CT LUMBAR SPINE WO CONTRAST  Result Date: 12/16/2022    1. Multilevel lumbar spondylosis which is moderate to marked in severity. Multilevel mild to moderate neural foraminal stenosis. No severe bony canal stenosis. 2. No acute abnormality. 3. Retroperitoneal soft tissues are unremarkable. Recommendation: Follow up as clinically indicated. All CT scans at this facility utilize dose modulation, iterative reconstruction, and/or weight based dosing when appropriate to reduce radiation dose to as low as reasonably achievable. Dictated and Electronically Signed by Torin Bauer MD, [address] [place] [address] at [id number] 05:46:42 PM               CT PELVIS WO CONTRAST Additional Contrast? None  Result Date: 12/16/2022    1. Multiple fractures of the right sacrum, right inferior pubic rami and left pubic symphysis. 2. No subluxation or dislocation.  3. Soft tissue swelling/blood products adjacent to the angulated right inferior pubic rami fracture. 4. Right hip prosthesis appears intact. Recommendation: Follow up as clinically indicated. All CT scans at this facility utilize dose modulation, iterative reconstruction, and/or weight based dosing when appropriate to reduce radiation dose to as low as reasonably achievable. Dictated and Electronically Signed by Kristie Nava MD, SA CERTIFIED at [id number] 05:52:09 PM               XR CHEST PORTABLE  Result Date: 12/16/2022    No acute cardiopulmonary process identified. XR HIP 2-3 VW W PELVIS RIGHT  Result Date: 12/16/2022    The bones are diffusely demineralized. Question nondisplaced right inferior pubic ramus fracture and minimally displaced left superior pubic ramus fracture. Intramedullary sudhir is seen in the right femur. The hardware appears intact. A CT showed be performed for further characterization. Vascular calcifications. Mohan Fraser [[id number]] Collected: 12/16/22 1333   Order Status: Completed Specimen: Nasopharyngeal Swab Updated: 12/16/22 1402    SARS-CoV-2, NAAT Not Detected       Assessment/Plan   Principal Problem:    Closed fracture of other parts of pelvis, initial encounter [facility])  Active Problems:    Fall    Syncope and collapse    COVID  Resolved Problems:    * No resolved hospital problems.  *      Principal Problem:    Closed fracture of other parts of pelvis, initial encounter [facility])-    - Orthopedic surgery consulted    - Recommended nonoperative treatment, pain control, DVT prophylaxis, weightbearing as tolerated with a walker.   - Pain control   - PT/OT   - Case management assisting with DC planning, pending SNF placement, bed placement for 12/22- on hold Covid    Active Problems:    Fall/ Syncope and collapse-    - Recent ECHO (11/29/2022) indicated trace TR, mild AR, severe TR, preserved LVEF 97%, grade 1 diastolic dysfunction    - neuro checks    - fall precautions     Elevated Ddimer -    - CTA pulmonary to rule out PE- Atelectasis and pneumonia is at the lung bases with traction bronchiectasis. Paraseptal emphysema. Calcified granuloma right upper lobe. - Supplemental oxygen as needed     Covid   -Quarantine, transfer to [facility]   -Isolation precautions   -Oxygen Therapy Protocol   -Monitor for symptoms      DVT Prophylaxis: Eliquis     Discharge planning: Case management assisting with DC planning      Further Orders per Clinical course/attending. Electronically signed by YOVANA Sahu CNP on 12/24/2022 at 8:39 AM       EMR Dragon/Transcription disclaimer:   Much of this encounter note is an electronic transcription/translation of spoken language to printed text.  The electronic translation of spoken language may permit erroneous, or at times, nonsensical words or phrases to be inadvertently transcribed; although attempts have made to review the note for such errors, some may still exist.

## 2022-12-24 NOTE — PLAN OF CARE
Problem: Discharge Planning  Goal: Discharge to home or other facility with appropriate resources  12/24/2022 1244 by Josesito Darling RN  Outcome: Progressing  12/23/2022 2252 by Darby Cárdenas RN  Outcome: Progressing     Problem: Skin/Tissue Integrity  Goal: Absence of new skin breakdown  Description: 1. Monitor for areas of redness and/or skin breakdown  2. Assess vascular access sites hourly  3. Every 4-6 hours minimum:  Change oxygen saturation probe site  4. Every 4-6 hours:  If on nasal continuous positive airway pressure, respiratory therapy assess nares and determine need for appliance change or resting period. 12/24/2022 1244 by Josesito Darling RN  Outcome: Progressing  12/23/2022 2252 by Darby Cárdenas RN  Outcome: Progressing     Problem: ABCDS Injury Assessment  Goal: Absence of physical injury  12/24/2022 1244 by Josesito Darling RN  Outcome: Progressing  12/23/2022 2252 by Darby Cárdenas RN  Outcome: Progressing     Problem: Safety - Adult  Goal: Free from fall injury  12/24/2022 1244 by Josesito Darling RN  Outcome: Progressing  12/23/2022 2252 by Darby Cárdenas RN  Outcome: Progressing     Problem: Confusion  Goal: Confusion, delirium, dementia, or psychosis is improved or at baseline  Description: INTERVENTIONS:  1. Assess for possible contributors to thought disturbance, including medications, impaired vision or hearing, underlying metabolic abnormalities, dehydration, psychiatric diagnoses, and notify attending LIP  2. [place] high risk fall precautions, as indicated  3. Provide frequent short contacts to provide reality reorientation, refocusing and direction  4. Decrease environmental stimuli, including noise as appropriate  5. Monitor and intervene to maintain adequate nutrition, hydration, elimination, sleep and activity  6. If unable to ensure safety without constant attention obtain sitter and review sitter guidelines with assigned personnel  7.  Initiate Psychosocial CNS and Spiritual Care consult, as indicated  12/24/2022 1244 by Bernardo Castro RN  Outcome: Progressing  12/23/2022 2252 by Deb Maravilla RN  Outcome: Progressing     Problem: Safety - Medical Restraint  Goal: Remains free of injury from restraints (Restraint for Interference with Medical Device)  Description: INTERVENTIONS:  1. Determine that other, less restrictive measures have been tried or would not be effective before applying the restraint  2. Evaluate the patient's condition at the time of restraint application  3. Inform patient/family regarding the reason for restraint  4.  Q2H: Monitor safety, psychosocial status, comfort, nutrition and hydration  12/24/2022 1244 by Bernardo Castro RN  Outcome: Progressing  12/23/2022 2252 by Deb Maravilla RN  Outcome: Progressing     Problem: Nutrition Deficit:  Goal: Optimize nutritional status  12/24/2022 1244 by Bernardo Castro RN  Outcome: Progressing  12/23/2022 2252 by Deb Maravilla RN  Outcome: Progressing     Problem: Neurosensory - Adult  Goal: Achieves stable or improved neurological status  12/24/2022 1244 by Bernardo Castro RN  Outcome: Progressing  12/23/2022 2252 by Deb Maravilla RN  Outcome: Progressing  Goal: Absence of seizures  12/24/2022 1244 by Bernardo Castro RN  Outcome: Progressing  12/23/2022 2252 by Deb Maravilla RN  Outcome: Progressing  Goal: Remains free of injury related to seizures activity  12/24/2022 1244 by Bernardo Castro RN  Outcome: Progressing  12/23/2022 2252 by Deb Maravilla RN  Outcome: Progressing  Goal: Achieves maximal functionality and self care  12/24/2022 1244 by Bernardo Castro RN  Outcome: Progressing  12/23/2022 2252 by Deb Maravilla RN  Outcome: Progressing     Problem: Cardiovascular - Adult  Goal: Maintains optimal cardiac output and hemodynamic stability  12/24/2022 1244 by Bernardo Castro RN  Outcome: Progressing  12/23/2022 2252 by Deb Maravilla RN  Outcome: Progressing  Goal: Absence of cardiac dysrhythmias or at baseline  12/24/2022 1244 by Angie Merida RN  Outcome: Progressing  12/23/2022 2252 by Leona Rees RN  Outcome: Progressing     Problem: Skin/Tissue Integrity - Adult  Goal: Skin integrity remains intact  12/24/2022 1244 by Angie Merida RN  Outcome: Progressing  12/23/2022 2252 by Leona Rees RN  Outcome: Progressing  Goal: Incisions, wounds, or drain sites healing without S/S of infection  12/24/2022 1244 by Angie Merida RN  Outcome: Progressing  12/23/2022 2252 by Leona Rees RN  Outcome: Progressing  Goal: Oral mucous membranes remain intact  12/24/2022 1244 by Angie Merida RN  Outcome: Progressing  12/23/2022 2252 by Leona Rees RN  Outcome: Progressing     Problem: Musculoskeletal - Adult  Goal: Return mobility to safest level of function  12/24/2022 1244 by Angie Merida RN  Outcome: Progressing  12/23/2022 2252 by Leona Rees RN  Outcome: Progressing  Goal: Maintain proper alignment of affected body part  12/24/2022 1244 by Angie Merida RN  Outcome: Progressing  12/23/2022 2252 by Leona Rees RN  Outcome: Progressing  Goal: Return ADL status to a safe level of function  12/24/2022 1244 by Angie Merida RN  Outcome: Progressing  12/23/2022 2252 by eLona Rees RN  Outcome: Progressing     Problem: Genitourinary - Adult  Goal: Absence of urinary retention  12/24/2022 1244 by Angie Merida RN  Outcome: Progressing  12/23/2022 2252 by Leona Rees RN  Outcome: Progressing  Goal: Urinary catheter remains patent  12/24/2022 1244 by Angie Merida RN  Outcome: Progressing  12/23/2022 2252 by Leona Rees RN  Outcome: Progressing     Problem: Pain  Goal: Verbalizes/displays adequate comfort level or baseline comfort level  12/24/2022 1244 by Angie Merida RN  Outcome: Progressing  12/23/2022 2252 by Leona Rees RN  Outcome: Progressing

## 2022-12-24 NOTE — PROGRESS NOTES
[facility]      Progress Note    Patient:  Roly Oliver  YOB: 1932  Date of Service: 12/23/2022  MRN: [id number]   Acct: [de-identified]   Primary Care Physician: Suma Sandoval  Advance Directive: Full Code  Admit Date: 12/16/2022       Hospital Day: 7    Portions of this note have been copied forward, however, updated to reflect the most current clinical status of this patient. CHIEF COMPLAINT Fall     SUBJECTIVE:  Ms. Adrienne Dominguez was resting in bed this morning. Reported tolerable Left hip pain. Denies SOB or chest pain at this time. CUMULATIVE HOSPITAL COURSE:   The patient is a 80 y.o. female with PMH of HTN, hypothyroidism, AF who presented to [address] [place] [address] ED with complaints of fall. Stated she became dizzy, lightheadedness and LOC on day of admission. Reported landing on her right hip and head injury. Denied loss of bowel or bladder. Stated she was unable to ambulate after fall. Received IV fentanyl and IV Ativan due to increased pain in ED. Upon arrival to floor rapid response was called due to oxygen saturation in the 60s. Frank Bradford NP and Dr. Kelsie Macias present and 0.2 mg Narcan given, patient arousable to speech, and answering questions appropriately. Continuous pulse oximetry and monitor on telemetry on medical floor at this time. Work-up in ER CXR no acute cardiopulmonary process, CT lumbar spine no acute fracture or subluxation, CT head no acute intracranial abnormality, CT pelvis multiple fractures of right sacrum, right inferior pubic rami and left pubic symphysis, no subluxation or dislocation, soft tissue swelling/blood products to the angulated right inferior pubic rami fracture, right hip prosthetic appears intact. Patient was admitted to hospital medicine for pelvic fracture with orthopedic surgery consultation. Orthopedic surgery recommended nonoperative treatment, pain control, DVT prophylaxis, weightbearing as tolerated with a walker.     12/18/2022   Ortho continues to follow, non-operative management. Case management assisting SNF placement in [facility]. Continue to monitor and trend daily labs. Potassium replacement for 3.5, will recheck in AM.    12/19/2022  Pending referral to [facility] SNF. Calcium 8.7, [place] initiated, will follow-up with PCP on discharge. PT continue to management treatment. 12/20/2022   No change in patient status, continues to wait for SNF placement. Speech evaluation for swallowing study. Will change to Dysphagia, soft and bite size, no hard foods. 12/21/2022  Patient has placement, will discharge in early AM for EMS to transport patient to facility. No change in patients status, was up in chair with assist.  Speech evaluation - continuation soft and bite sized consistency with least restrictive thin liquids. Recommend meds whole in pudding/applesauce. 12/22/2022   Patient positive PCR at discharge, unable to discharge to SNF at this time. Will transfer patient to 4th floor, Covid precautions. 12/23/2022  Patient no Covid symptoms noted. Will continue to monitor. Has SNF placement, possible for Monday. Potassium replacement, will need follow-up when discharged for facility. Review of Systems   Constitutional:  Negative for chills, diaphoresis, fatigue and fever. HENT:  Negative for congestion, ear pain, sinus pain, sore throat and trouble swallowing. Eyes:  Negative for visual disturbance. Respiratory:  Negative for cough, shortness of breath and wheezing. Cardiovascular:  Negative for chest pain, palpitations and leg swelling. Gastrointestinal:  Negative for abdominal distention, abdominal pain, blood in stool, constipation, diarrhea, nausea and vomiting. Endocrine: Negative for cold intolerance and heat intolerance. Genitourinary:  Negative for difficulty urinating, flank pain, frequency and urgency. Musculoskeletal:  Positive for arthralgias. Negative for myalgias.         Left hip pain    Skin: Negative. Neurological:  Negative for dizziness, syncope, weakness, light-headedness, numbness and headaches. Hematological:  Does not bruise/bleed easily. Psychiatric/Behavioral:  Negative for agitation, confusion and dysphoric mood. Objective:   VITALS:  /77   Pulse 69   Temp 97.3 °F (36.3 °C) (Temporal)   Resp 18   Ht 5' 2\" (1.575 m)   Wt 100 lb 1 oz (45.4 kg)   SpO2 93%   BMI 18.30 kg/m²   24HR INTAKE/OUTPUT:    Intake/Output Summary (Last 24 hours) at 12/23/2022 1808  Last data filed at 12/23/2022 1722  Gross per 24 hour   Intake 480 ml   Output 2400 ml   Net -1920 ml       Physical Exam  Constitutional:       General: She is not in acute distress. Appearance: Normal appearance. She is not toxic-appearing or diaphoretic. HENT:      Head: Normocephalic and atraumatic. Right Ear: External ear normal.      Left Ear: External ear normal.      Nose: Nose normal. No congestion or rhinorrhea. Mouth/Throat:      Mouth: Mucous membranes are moist.      Pharynx: Oropharynx is clear. Eyes:      General: No scleral icterus. Extraocular Movements: Extraocular movements intact. Conjunctiva/sclera: Conjunctivae normal.   Cardiovascular:      Rate and Rhythm: Normal rate and regular rhythm. Pulses: Normal pulses. Heart sounds: Normal heart sounds. No murmur heard. No friction rub. No gallop. Pulmonary:      Effort: Pulmonary effort is normal. No respiratory distress. Breath sounds: Normal breath sounds. No wheezing, rhonchi or rales. Abdominal:      General: Abdomen is flat. Bowel sounds are normal. There is no distension. Palpations: Abdomen is soft. Tenderness: There is no abdominal tenderness. Musculoskeletal:         General: Tenderness present. No swelling. Normal range of motion. Cervical back: Normal range of motion and neck supple. Right lower leg: No edema. Left lower leg: No edema.       Comments: Left hip pain Skin:     General: Skin is warm and dry. Coloration: Skin is not jaundiced. Findings: No erythema, lesion or rash. Neurological:      General: No focal deficit present. Mental Status: She is alert and oriented to person, place, and time. Mental status is at baseline. Cranial Nerves: No cranial nerve deficit. Sensory: No sensory deficit. Motor: No weakness. Psychiatric:         Mood and Affect: Mood normal.         Behavior: Behavior normal.         Thought Content: Thought content normal.         Judgment: Judgment normal.          Medications:      sodium chloride        docusate sodium  100 mg Oral Daily    metoprolol succinate  50 mg Oral Daily    mirtazapine  7.5 mg Oral Nightly    oyster shell calcium w/D  1 tablet Oral Daily    sodium chloride flush  5-40 mL IntraVENous 2 times per day    isosorbide mononitrate  30 mg Oral Daily    levothyroxine  50 mcg Oral Daily    lisinopril  40 mg Oral Daily    Vitamin D  1,000 Units Oral Daily    apixaban  2.5 mg Oral BID     oxyCODONE-acetaminophen, traMADol, nitroGLYCERIN, potassium chloride **OR** potassium alternative oral replacement **OR** potassium chloride, sodium chloride flush, sodium chloride, ondansetron **OR** ondansetron, polyethylene glycol, acetaminophen **OR** acetaminophen  ADULT ORAL NUTRITION SUPPLEMENT; Breakfast, Lunch, Dinner; Standard High Calorie/High Protein Oral Supplement  ADULT DIET; Dysphagia - Soft and Bite Sized; Safety Tray; Safety Tray (Disposables)     Lab and other Data:     Recent Labs     12/21/22  1024   WBC 6.1   HGB 11.6*        Recent Labs     12/21/22  1024 12/23/22  1209    143   K 3.5 3.3*   * 103   CO2 22 30*   BUN 27* 27*   CREATININE 0.5 0.8   GLUCOSE 116* 100     Recent Labs     12/23/22  1209   AST 38*   ALT 33   BILITOT 1.2   ALKPHOS 71       INR:   No results for input(s): INR in the last 72 hours.   UA:  No results for input(s): NITRITE, COLORU, PHUR, LABCAST, WBCUA, RBCUA, MUCUS, TRICHOMONAS, YEAST, BACTERIA, CLARITYU, SPECGRAV, LEUKOCYTESUR, UROBILINOGEN, BILIRUBINUR, BLOODU, GLUCOSEU, AMORPHOUS in the last 72 hours. Invalid input(s): KETONESU    ABG:  No results for input(s): PHART, WBD5IKE, PO2ART, IST8HNS, BEART, HGBAE, Y6DNGKAY, CARBOXHGBART in the last 72 hours. RAD:     CT Head W/O Contrast  Result Date: 12/16/2022     1. No acute intracranial abnormality. 2. Mild chronic changes the brain parenchyma from small vessel ischemic. 3. Age-appropriate atrophic changes. Recommendation: Follow up as clinically indicated. All CT scans at this facility utilize dose modulation, iterative reconstruction, and/or weight based dosing when appropriate to reduce radiation dose to as low as reasonably achievable. Dictated and Electronically Signed by Komal Ponce MD, [facility] at [id number] 05:55:38 PM               CT LUMBAR SPINE WO CONTRAST  Result Date: 12/16/2022    1. Multilevel lumbar spondylosis which is moderate to marked in severity. Multilevel mild to moderate neural foraminal stenosis. No severe bony canal stenosis. 2. No acute abnormality. 3. Retroperitoneal soft tissues are unremarkable. Recommendation: Follow up as clinically indicated. All CT scans at this facility utilize dose modulation, iterative reconstruction, and/or weight based dosing when appropriate to reduce radiation dose to as low as reasonably achievable. Dictated and Electronically Signed by Komal Pocne MD, [address] [place] [address] at [id number] 05:46:42 PM               CT PELVIS WO CONTRAST Additional Contrast? None  Result Date: 12/16/2022    1. Multiple fractures of the right sacrum, right inferior pubic rami and left pubic symphysis. 2. No subluxation or dislocation. 3. Soft tissue swelling/blood products adjacent to the angulated right inferior pubic rami fracture. 4. Right hip prosthesis appears intact. Recommendation: Follow up as clinically indicated.  All CT scans at this facility utilize dose modulation, iterative reconstruction, and/or weight based dosing when appropriate to reduce radiation dose to as low as reasonably achievable. Dictated and Electronically Signed by Eleuterio Griffiths MD, [facility] CERTIFIED at [id number] 05:52:09 PM               XR CHEST PORTABLE  Result Date: 12/16/2022    No acute cardiopulmonary process identified. XR HIP 2-3 VW W PELVIS RIGHT  Result Date: 12/16/2022    The bones are diffusely demineralized. Question nondisplaced right inferior pubic ramus fracture and minimally displaced left superior pubic ramus fracture. Intramedullary sudhir is seen in the right femur. The hardware appears intact. A CT showed be performed for further characterization. Vascular calcifications. Mohan Dyson [[id number]] Collected: 12/16/22 1333   Order Status: Completed Specimen: Nasopharyngeal Swab Updated: 12/16/22 1402    SARS-CoV-2, NAAT Not Detected       Assessment/Plan   Principal Problem:    Closed fracture of other parts of pelvis, initial encounter [facility])  Active Problems:    Fall    Syncope and collapse    COVID  Resolved Problems:    * No resolved hospital problems. *      Principal Problem:    Closed fracture of other parts of pelvis, initial encounter [facility])-    - Orthopedic surgery consulted    - Recommended nonoperative treatment, pain control, DVT prophylaxis, weightbearing as tolerated with a walker.   - Pain control   - PT/OT   - Case management assisting with DC planning, pending SNF placement, bed placement for 12/22- on hold Covid    Active Problems:    Fall/ Syncope and collapse-    - Recent ECHO (11/29/2022) indicated trace TR, mild AR, severe TR, preserved LVEF 47%, grade 1 diastolic dysfunction    - neuro checks    - fall precautions     Elevated Ddimer -    - CTA pulmonary to rule out PE- Atelectasis and pneumonia is at the lung bases with traction bronchiectasis. Paraseptal emphysema. Calcified granuloma right upper lobe.     - Supplemental oxygen as needed     Covid   [facility], transfer to [facility]   -Isolation precautions   -Oxygen Therapy Protocol   -Monitor for symptoms      DVT Prophylaxis: Eliquis     Discharge planning: Case management assisting with DC planning      Further Orders per Clinical course/attending. Electronically signed by YOVANA Park CNP on 12/23/2022 at 6:08 PM       EMR Dragon/Transcription disclaimer:   Much of this encounter note is an electronic transcription/translation of spoken language to printed text.  The electronic translation of spoken language may permit erroneous, or at times, nonsensical words or phrases to be inadvertently transcribed; although attempts have made to review the note for such errors, some may still exist.

## 2022-12-24 NOTE — PLAN OF CARE
Problem: Skin/Tissue Integrity  Goal: Absence of new skin breakdown  Description: 1. Monitor for areas of redness and/or skin breakdown  2. Assess vascular access sites hourly  3. Every 4-6 hours minimum:  Change oxygen saturation probe site  4. Every 4-6 hours:  If on nasal continuous positive airway pressure, respiratory therapy assess nares and determine need for appliance change or resting period. Outcome: Progressing     Problem: ABCDS Injury Assessment  Goal: Absence of physical injury  Outcome: Progressing     Problem: Safety - Adult  Goal: Free from fall injury  Outcome: Progressing     Problem: Confusion  Goal: Confusion, delirium, dementia, or psychosis is improved or at baseline  Description: INTERVENTIONS:  1. Assess for possible contributors to thought disturbance, including medications, impaired vision or hearing, underlying metabolic abnormalities, dehydration, psychiatric diagnoses, and notify attending LIP  2. [place] high risk fall precautions, as indicated  3. Provide frequent short contacts to provide reality reorientation, refocusing and direction  4. Decrease environmental stimuli, including noise as appropriate  5. Monitor and intervene to maintain adequate nutrition, hydration, elimination, sleep and activity  6. If unable to ensure safety without constant attention obtain sitter and review sitter guidelines with assigned personnel  7. Initiate Psychosocial CNS and Spiritual Care consult, as indicated  Outcome: Progressing     Problem: Safety - Medical Restraint  Goal: Remains free of injury from restraints (Restraint for Interference with Medical Device)  Description: INTERVENTIONS:  1. Determine that other, less restrictive measures have been tried or would not be effective before applying the restraint  2. Evaluate the patient's condition at the time of restraint application  3. Inform patient/family regarding the reason for restraint  4.  Q2H: Monitor safety, psychosocial status, comfort, nutrition and hydration  Outcome: Progressing     Problem: Nutrition Deficit:  Goal: Optimize nutritional status  Outcome: Progressing     Problem: Neurosensory - Adult  Goal: Achieves stable or improved neurological status  Outcome: Progressing  Goal: Absence of seizures  Outcome: Progressing  Goal: Remains free of injury related to seizures activity  Outcome: Progressing  Goal: Achieves maximal functionality and self care  Outcome: Progressing     Problem: Cardiovascular - Adult  Goal: Maintains optimal cardiac output and hemodynamic stability  Outcome: Progressing  Goal: Absence of cardiac dysrhythmias or at baseline  Outcome: Progressing     Problem: Skin/Tissue Integrity - Adult  Goal: Skin integrity remains intact  Outcome: Progressing  Goal: Incisions, wounds, or drain sites healing without S/S of infection  Outcome: Progressing  Goal: Oral mucous membranes remain intact  Outcome: Progressing     Problem: Musculoskeletal - Adult  Goal: Return mobility to safest level of function  Outcome: Progressing  Goal: Maintain proper alignment of affected body part  Outcome: Progressing  Goal: Return ADL status to a safe level of function  Outcome: Progressing     Problem: Genitourinary - Adult  Goal: Absence of urinary retention  Outcome: Progressing  Goal: Urinary catheter remains patent  Outcome: Progressing     Problem: Pain  Goal: Verbalizes/displays adequate comfort level or baseline comfort level  Outcome: Progressing

## 2022-12-25 LAB
ALBUMIN SERPL-MCNC: 3.1 G/DL (ref 3.5–5.2)
ALP BLD-CCNC: 94 U/L (ref 35–104)
ALT SERPL-CCNC: 27 U/L (ref 5–33)
ANION GAP SERPL CALCULATED.3IONS-SCNC: 7 MMOL/L (ref 7–19)
AST SERPL-CCNC: 27 U/L (ref 5–32)
BILIRUB SERPL-MCNC: 1 MG/DL (ref 0.2–1.2)
BUN BLDV-MCNC: 47 MG/DL (ref 8–23)
CALCIUM SERPL-MCNC: 9.4 MG/DL (ref 8.8–10.2)
CHLORIDE BLD-SCNC: 103 MMOL/L (ref 98–111)
CO2: 33 MMOL/L (ref 22–29)
CREAT SERPL-MCNC: 0.6 MG/DL (ref 0.5–0.9)
GFR SERPL CREATININE-BSD FRML MDRD: >60 ML/MIN/{1.73_M2}
GLUCOSE BLD-MCNC: 126 MG/DL (ref 74–109)
POTASSIUM REFLEX MAGNESIUM: 4.1 MMOL/L (ref 3.5–5)
POTASSIUM SERPL-SCNC: 5 MMOL/L (ref 3.5–5)
SODIUM BLD-SCNC: 143 MMOL/L (ref 136–145)
TOTAL PROTEIN: 5.9 G/DL (ref 6.6–8.7)

## 2022-12-25 PROCEDURE — 1210000000 HC MED SURG R&B

## 2022-12-25 PROCEDURE — 2580000003 HC RX 258: Performed by: STUDENT IN AN ORGANIZED HEALTH CARE EDUCATION/TRAINING PROGRAM

## 2022-12-25 PROCEDURE — 2580000003 HC RX 258

## 2022-12-25 PROCEDURE — 6370000000 HC RX 637 (ALT 250 FOR IP): Performed by: HOSPITALIST

## 2022-12-25 PROCEDURE — 6370000000 HC RX 637 (ALT 250 FOR IP): Performed by: STUDENT IN AN ORGANIZED HEALTH CARE EDUCATION/TRAINING PROGRAM

## 2022-12-25 PROCEDURE — 94760 N-INVAS EAR/PLS OXIMETRY 1: CPT

## 2022-12-25 PROCEDURE — 84132 ASSAY OF SERUM POTASSIUM: CPT

## 2022-12-25 PROCEDURE — 6370000000 HC RX 637 (ALT 250 FOR IP)

## 2022-12-25 PROCEDURE — 80053 COMPREHEN METABOLIC PANEL: CPT

## 2022-12-25 PROCEDURE — 36415 COLL VENOUS BLD VENIPUNCTURE: CPT

## 2022-12-25 RX ORDER — OXYCODONE AND ACETAMINOPHEN 7.5; 325 MG/1; MG/1
1 TABLET ORAL EVERY 6 HOURS PRN
Status: DISCONTINUED | OUTPATIENT
Start: 2022-12-25 | End: 2022-12-26 | Stop reason: HOSPADM

## 2022-12-25 RX ORDER — 0.9 % SODIUM CHLORIDE 0.9 %
500 INTRAVENOUS SOLUTION INTRAVENOUS ONCE
Status: COMPLETED | OUTPATIENT
Start: 2022-12-25 | End: 2022-12-25

## 2022-12-25 RX ORDER — METOPROLOL SUCCINATE 25 MG/1
25 TABLET, EXTENDED RELEASE ORAL DAILY
Status: DISCONTINUED | OUTPATIENT
Start: 2022-12-26 | End: 2022-12-26 | Stop reason: HOSPADM

## 2022-12-25 RX ADMIN — Medication 1000 UNITS: at 10:09

## 2022-12-25 RX ADMIN — APIXABAN 2.5 MG: 2.5 TABLET, FILM COATED ORAL at 21:21

## 2022-12-25 RX ADMIN — ISOSORBIDE MONONITRATE 30 MG: 30 TABLET, EXTENDED RELEASE ORAL at 10:08

## 2022-12-25 RX ADMIN — Medication 1 TABLET: at 10:09

## 2022-12-25 RX ADMIN — METOPROLOL SUCCINATE 50 MG: 50 TABLET, EXTENDED RELEASE ORAL at 10:09

## 2022-12-25 RX ADMIN — APIXABAN 2.5 MG: 2.5 TABLET, FILM COATED ORAL at 10:09

## 2022-12-25 RX ADMIN — SODIUM CHLORIDE 500 ML: 9 INJECTION, SOLUTION INTRAVENOUS at 15:08

## 2022-12-25 RX ADMIN — DOCUSATE SODIUM 100 MG: 100 CAPSULE, LIQUID FILLED ORAL at 10:09

## 2022-12-25 RX ADMIN — SODIUM CHLORIDE, PRESERVATIVE FREE 10 ML: 5 INJECTION INTRAVENOUS at 21:23

## 2022-12-25 RX ADMIN — LEVOTHYROXINE SODIUM 50 MCG: 50 TABLET ORAL at 06:07

## 2022-12-25 RX ADMIN — OXYCODONE HYDROCHLORIDE AND ACETAMINOPHEN 1 TABLET: 5; 325 TABLET ORAL at 10:09

## 2022-12-25 RX ADMIN — OXYCODONE HYDROCHLORIDE AND ACETAMINOPHEN 1 TABLET: 7.5; 325 TABLET ORAL at 17:10

## 2022-12-25 RX ADMIN — SODIUM CHLORIDE, PRESERVATIVE FREE 10 ML: 5 INJECTION INTRAVENOUS at 15:08

## 2022-12-25 RX ADMIN — MIRTAZAPINE 7.5 MG: 7.5 TABLET ORAL at 21:21

## 2022-12-25 ASSESSMENT — ENCOUNTER SYMPTOMS
DIARRHEA: 0
SHORTNESS OF BREATH: 0
SORE THROAT: 0
ABDOMINAL PAIN: 0
BLOOD IN STOOL: 0
NAUSEA: 0
SINUS PAIN: 0
ABDOMINAL DISTENTION: 0
VOMITING: 0
CONSTIPATION: 0
TROUBLE SWALLOWING: 0
WHEEZING: 0
COUGH: 0

## 2022-12-25 ASSESSMENT — PAIN DESCRIPTION - ORIENTATION
ORIENTATION: RIGHT
ORIENTATION: RIGHT

## 2022-12-25 ASSESSMENT — PAIN SCALES - GENERAL
PAINLEVEL_OUTOF10: 7
PAINLEVEL_OUTOF10: 7
PAINLEVEL_OUTOF10: 4
PAINLEVEL_OUTOF10: 7

## 2022-12-25 ASSESSMENT — PAIN DESCRIPTION - DESCRIPTORS
DESCRIPTORS: ACHING
DESCRIPTORS: DISCOMFORT

## 2022-12-25 ASSESSMENT — PAIN DESCRIPTION - LOCATION
LOCATION: PELVIS
LOCATION: HIP

## 2022-12-25 NOTE — PROGRESS NOTES
[facility]      Progress Note    Patient:  Camilla Ventura  YOB: 1932  Date of Service: 12/25/2022  MRN: [id number]   Acct: [de-identified]   Primary Care Physician: Colonel Beasley  Advance Directive: Full Code  Admit Date: 12/16/2022       Hospital Day: 9    Portions of this note have been copied forward, however, updated to reflect the most current clinical status of this patient. CHIEF COMPLAINT Fall     SUBJECTIVE:  Ms. Fady Mortensen was resting comfortably in bed this morning. Denies SOB, chest pain, fever, chills, nausea, or vomiting. CUMULATIVE HOSPITAL COURSE:   The patient is a 80 y.o. female with PMH of HTN, hypothyroidism, AF who presented to [address] [place] [address] ED with complaints of fall. Stated she became dizzy, lightheadedness and LOC on day of admission. Reported landing on her right hip and head injury. Denied loss of bowel or bladder. Stated she was unable to ambulate after fall. Received IV fentanyl and IV Ativan due to increased pain in ED. Upon arrival to floor rapid response was called due to oxygen saturation in the 60s. Jv Moreira NP and Dr. Hien Cohn present and 0.2 mg Narcan given, patient arousable to speech, and answering questions appropriately. Continuous pulse oximetry and monitor on telemetry on medical floor at this time. Work-up in ER CXR no acute cardiopulmonary process, CT lumbar spine no acute fracture or subluxation, CT head no acute intracranial abnormality, CT pelvis multiple fractures of right sacrum, right inferior pubic rami and left pubic symphysis, no subluxation or dislocation, soft tissue swelling/blood products to the angulated right inferior pubic rami fracture, right hip prosthetic appears intact. Patient was admitted to hospital medicine for pelvic fracture with orthopedic surgery consultation. Orthopedic surgery recommended nonoperative treatment, pain control, DVT prophylaxis, weightbearing as tolerated with a walker.   Case management was assisting with SNF placement. Patient was evaluated by speech, whom recommended dysphagia, soft and bite-size. Patient was ready for discharge to SNF on 12/22/2022, however tested positive for COVID. Patient asymptomatic from COVID standpoint. Did not require supplemental oxygen. Review of Systems   Constitutional:  Negative for chills, diaphoresis, fatigue and fever. HENT:  Negative for congestion, ear pain, sinus pain, sore throat and trouble swallowing. Eyes:  Negative for visual disturbance. Respiratory:  Negative for cough, shortness of breath and wheezing. Denies shortness of breath   Cardiovascular:  Negative for chest pain, palpitations and leg swelling. Denies chest pain   Gastrointestinal:  Negative for abdominal distention, abdominal pain, blood in stool, constipation, diarrhea, nausea and vomiting. Endocrine: Negative for cold intolerance and heat intolerance. Genitourinary:  Negative for difficulty urinating, flank pain, frequency and urgency. Musculoskeletal:  Negative for arthralgias and myalgias. Neurological:  Negative for dizziness, syncope, weakness, light-headedness, numbness and headaches. Hematological:  Does not bruise/bleed easily. Psychiatric/Behavioral:  Negative for agitation, confusion and dysphoric mood. Objective:   VITALS:  BP (!) 88/52   Pulse 85   Temp 97.7 °F (36.5 °C) (Temporal)   Resp 18   Ht 5' 2\" (1.575 m)   Wt 100 lb 1 oz (45.4 kg)   SpO2 97%   BMI 18.30 kg/m²   24HR INTAKE/OUTPUT:    Intake/Output Summary (Last 24 hours) at 12/25/2022 1641  Last data filed at 12/25/2022 0544  Gross per 24 hour   Intake 240 ml   Output 575 ml   Net -335 ml         Physical Exam  Constitutional:       General: She is not in acute distress. Appearance: Normal appearance. She is not toxic-appearing or diaphoretic. HENT:      Head: Normocephalic and atraumatic.       Right Ear: External ear normal.      Left Ear: External ear normal.      Nose: Nose normal. No congestion or rhinorrhea. Mouth/Throat:      Mouth: Mucous membranes are moist.      Pharynx: Oropharynx is clear. Eyes:      General: No scleral icterus. Extraocular Movements: Extraocular movements intact. Conjunctiva/sclera: Conjunctivae normal.   Cardiovascular:      Rate and Rhythm: Normal rate and regular rhythm. Pulses: Normal pulses. Heart sounds: Normal heart sounds. No murmur heard. No friction rub. No gallop. Pulmonary:      Effort: Pulmonary effort is normal. No respiratory distress. Breath sounds: Normal breath sounds. No wheezing, rhonchi or rales. Abdominal:      General: Abdomen is flat. Bowel sounds are normal. There is no distension. Palpations: Abdomen is soft. Tenderness: There is no abdominal tenderness. Musculoskeletal:         General: No swelling or tenderness. Normal range of motion. Cervical back: Normal range of motion and neck supple. Right lower leg: No edema. Left lower leg: No edema. Skin:     General: Skin is warm and dry. Coloration: Skin is not jaundiced. Findings: No erythema, lesion or rash. Neurological:      General: No focal deficit present. Mental Status: She is alert and oriented to person, place, and time. Mental status is at baseline. Cranial Nerves: No cranial nerve deficit. Sensory: No sensory deficit. Motor: No weakness. Psychiatric:         Mood and Affect: Mood normal.         Behavior: Behavior normal.         Thought Content:  Thought content normal.         Judgment: Judgment normal.          Medications:      sodium chloride        [START ON 12/26/2022] metoprolol succinate  25 mg Oral Daily    docusate sodium  100 mg Oral Daily    mirtazapine  7.5 mg Oral Nightly    oyster shell calcium w/D  1 tablet Oral Daily    sodium chloride flush  5-40 mL IntraVENous 2 times per day    isosorbide mononitrate  30 mg Oral Daily    levothyroxine  50 mcg Oral Daily [Held by provider] lisinopril  40 mg Oral Daily    Vitamin D  1,000 Units Oral Daily    apixaban  2.5 mg Oral BID     oxyCODONE-acetaminophen, nitroGLYCERIN, potassium chloride **OR** potassium alternative oral replacement **OR** potassium chloride, sodium chloride flush, sodium chloride, ondansetron **OR** ondansetron, polyethylene glycol, acetaminophen **OR** acetaminophen  ADULT ORAL NUTRITION SUPPLEMENT; Breakfast, Lunch, Dinner; Standard High Calorie/High Protein Oral Supplement  ADULT DIET; Dysphagia - Soft and Bite Sized; Safety Tray; Safety Tray (Disposables)     Lab and other Data:     No results for input(s): WBC, HGB, PLT in the last 72 hours. Recent Labs     12/23/22  1209 12/24/22  1118 12/25/22  0336 12/25/22  0930    142  --  143   K 3.3* 3.9 5.0 4.1    101  --  103   CO2 30* 32*  --  33*   BUN 27* 55*  --  47*   CREATININE 0.8 0.9  --  0.6   GLUCOSE 100 129*  --  126*       Recent Labs     12/23/22  1209 12/25/22  0930   AST 38* 27   ALT 33 27   BILITOT 1.2 1.0   ALKPHOS 71 94           RAD:     CT Head W/O Contrast  Result Date: 12/16/2022     1. No acute intracranial abnormality. 2. Mild chronic changes the brain parenchyma from small vessel ischemic. 3. Age-appropriate atrophic changes. Recommendation: Follow up as clinically indicated. All CT scans at this facility utilize dose modulation, iterative reconstruction, and/or weight based dosing when appropriate to reduce radiation dose to as low as reasonably achievable. Dictated and Electronically Signed by James Boudreaux MD, MQSA CERTIFIED at [id number] 05:55:38 PM               CT LUMBAR SPINE WO CONTRAST  Result Date: 12/16/2022    1. Multilevel lumbar spondylosis which is moderate to marked in severity. Multilevel mild to moderate neural foraminal stenosis. No severe bony canal stenosis. 2. No acute abnormality. 3. Retroperitoneal soft tissues are unremarkable. Recommendation: Follow up as clinically indicated.  All CT scans at this facility utilize dose modulation, iterative reconstruction, and/or weight based dosing when appropriate to reduce radiation dose to as low as reasonably achievable. Dictated and Electronically Signed by Vee Jo MD, [address] [place] [address] at [id number] 05:46:42 PM               CT PELVIS WO CONTRAST Additional Contrast? None  Result Date: 12/16/2022    1. Multiple fractures of the right sacrum, right inferior pubic rami and left pubic symphysis. 2. No subluxation or dislocation. 3. Soft tissue swelling/blood products adjacent to the angulated right inferior pubic rami fracture. 4. Right hip prosthesis appears intact. Recommendation: Follow up as clinically indicated. All CT scans at this facility utilize dose modulation, iterative reconstruction, and/or weight based dosing when appropriate to reduce radiation dose to as low as reasonably achievable. Dictated and Electronically Signed by Vee Jo MD, [facility] [[email or web address]] CERTIFIED at [id number] 05:52:09 PM               XR CHEST PORTABLE  Result Date: 12/16/2022    No acute cardiopulmonary process identified. XR HIP 2-3 VW W PELVIS RIGHT  Result Date: 12/16/2022    The bones are diffusely demineralized. Question nondisplaced right inferior pubic ramus fracture and minimally displaced left superior pubic ramus fracture. Intramedullary sudhir is seen in the right femur. The hardware appears intact. A CT showed be performed for further characterization. Vascular calcifications. MicroMonique No, Rapid [[id number]] Collected: 12/16/22 1333   Order Status: Completed Specimen: Nasopharyngeal Swab Updated: 12/16/22 1402    SARS-CoV-2, NAAT Not Detected       Assessment/Plan   Principal Problem:    Closed fracture of other parts of pelvis, initial encounter [facility])  Active Problems:    Fall    Syncope and collapse    COVID  Resolved Problems:    * No resolved hospital problems.  *      Principal Problem:    Closed fracture of other parts of pelvis, initial encounter ([address]- - Orthopedic surgery consulted    - Recommended nonoperative treatment, pain control, DVT prophylaxis, weightbearing as tolerated with a walker.   - Pain control   - PT/OT   - Case management assisting with DC planning, pending SNF placement, bed placement on hold due to positive COVID, possible DC on 12/26/2022        Active Problems:    COVID-    - Asymptomatic    - Supportive care    - Supplemental oxygen as needed    - Quarantine until SNF placement      Fall/ Syncope and collapse-    - Recent ECHO (11/29/2022) indicated trace TR, mild AR, severe TR, preserved LVEF 44%, grade 1 diastolic dysfunction    - neuro checks    - Orthostatic vitals    - fall precautions    - monitor on telemetry       Elevated Ddimer -    - CTA pulmonary to rule out PE   - Supplemental oxygen as needed         DVT Prophylaxis: Eliquis     Discharge planning: Case management assisting with DC planning to SNF       Further Orders per Clinical course/attending. Electronically signed by YOVANA Burgess CNP on 12/25/2022 at 4:41 PM       EMR Dragon/Transcription disclaimer:   Much of this encounter note is an electronic transcription/translation of spoken language to printed text.  The electronic translation of spoken language may permit erroneous, or at times, nonsensical words or phrases to be inadvertently transcribed; although attempts have made to review the note for such errors, some may still exist.

## 2022-12-25 NOTE — PLAN OF CARE
Problem: Discharge Planning  Goal: Discharge to home or other facility with appropriate resources  12/25/2022 0136 by Glenroy Gunter RN  Outcome: Progressing  12/24/2022 1244 by Brad Rondon RN  Outcome: Progressing     Problem: Skin/Tissue Integrity  Goal: Absence of new skin breakdown  Description: 1. Monitor for areas of redness and/or skin breakdown  2. Assess vascular access sites hourly  3. Every 4-6 hours minimum:  Change oxygen saturation probe site  4. Every 4-6 hours:  If on nasal continuous positive airway pressure, respiratory therapy assess nares and determine need for appliance change or resting period.   12/25/2022 0136 by Glenroy Gunter RN  Outcome: Progressing  12/24/2022 1244 by Brad Rondon RN  Outcome: Progressing     Problem: ABCDS Injury Assessment  Goal: Absence of physical injury  12/25/2022 0136 by Glenroy Gunter RN  Outcome: Progressing  12/24/2022 1244 by Brad Rondon RN  Outcome: Progressing     Problem: Safety - Adult  Goal: Free from fall injury  12/25/2022 0136 by Glenroy Gunter RN  Outcome: Progressing  12/24/2022 1244 by Brad Rondon RN  Outcome: Progressing     Problem: Cardiovascular - Adult  Goal: Maintains optimal cardiac output and hemodynamic stability  12/25/2022 0136 by Glenroy Gunter RN  Outcome: Progressing  12/24/2022 1244 by Brad Rondon RN  Outcome: Progressing  Goal: Absence of cardiac dysrhythmias or at baseline  12/25/2022 0136 by Glenroy Gunter RN  Outcome: Progressing  12/24/2022 1244 by Brad Rondon RN  Outcome: Progressing     Problem: Skin/Tissue Integrity - Adult  Goal: Skin integrity remains intact  12/25/2022 0136 by Glenroy Gunter RN  Outcome: Progressing  12/24/2022 1244 by Brad Rondon RN  Outcome: Progressing  Goal: Incisions, wounds, or drain sites healing without S/S of infection  12/25/2022 0136 by Glenroy Gunter RN  Outcome: Progressing  12/24/2022 1244 by Brad Rondon RN  Outcome: Progressing  Goal: Oral mucous membranes remain intact  12/25/2022 0136 by Ricky Guevara RN  Outcome: Progressing  12/24/2022 1244 by Bernardo Castro RN  Outcome: Progressing     Problem: Musculoskeletal - Adult  Goal: Return mobility to safest level of function  12/25/2022 0136 by Ricky Guevara RN  Outcome: Progressing  12/24/2022 1244 by Bernardo Castro RN  Outcome: Progressing  Goal: Maintain proper alignment of affected body part  12/25/2022 0136 by Ricky Guevara RN  Outcome: Progressing  12/24/2022 1244 by Bernardo Castro RN  Outcome: Progressing  Goal: Return ADL status to a safe level of function  12/25/2022 0136 by Ricky Guevara RN  Outcome: Progressing  12/24/2022 1244 by Bernardo Castro RN  Outcome: Progressing

## 2022-12-25 NOTE — PROGRESS NOTES
PLEASE CALL FAMILY WHEN DISCHARGED so that they may meet her at SNF on 12/26/2022.     Electronically signed by Debora Baker RN on 12/25/2022 at 2:04 PM

## 2022-12-26 VITALS
DIASTOLIC BLOOD PRESSURE: 86 MMHG | WEIGHT: 100.06 LBS | TEMPERATURE: 97.7 F | BODY MASS INDEX: 18.41 KG/M2 | HEART RATE: 76 BPM | OXYGEN SATURATION: 96 % | RESPIRATION RATE: 20 BRPM | SYSTOLIC BLOOD PRESSURE: 143 MMHG | HEIGHT: 62 IN

## 2022-12-26 LAB — POTASSIUM SERPL-SCNC: 4.8 MMOL/L (ref 3.5–5)

## 2022-12-26 PROCEDURE — 94760 N-INVAS EAR/PLS OXIMETRY 1: CPT

## 2022-12-26 PROCEDURE — 6370000000 HC RX 637 (ALT 250 FOR IP)

## 2022-12-26 PROCEDURE — 2580000003 HC RX 258

## 2022-12-26 PROCEDURE — 36415 COLL VENOUS BLD VENIPUNCTURE: CPT

## 2022-12-26 PROCEDURE — 6370000000 HC RX 637 (ALT 250 FOR IP): Performed by: STUDENT IN AN ORGANIZED HEALTH CARE EDUCATION/TRAINING PROGRAM

## 2022-12-26 PROCEDURE — 84132 ASSAY OF SERUM POTASSIUM: CPT

## 2022-12-26 RX ORDER — OXYCODONE HYDROCHLORIDE AND ACETAMINOPHEN 5; 325 MG/1; MG/1
1 TABLET ORAL EVERY 6 HOURS PRN
Qty: 12 TABLET | Refills: 0 | Status: SHIPPED | OUTPATIENT
Start: 2022-12-26 | End: 2022-12-29

## 2022-12-26 RX ORDER — PSEUDOEPHEDRINE HCL 30 MG
100 TABLET ORAL DAILY
Refills: 0 | DISCHARGE
Start: 2022-12-27

## 2022-12-26 RX ORDER — OXYCODONE HYDROCHLORIDE AND ACETAMINOPHEN 5; 325 MG/1; MG/1
1 TABLET ORAL EVERY 6 HOURS PRN
Qty: 12 TABLET | Refills: 0 | Status: SHIPPED | DISCHARGE
Start: 2022-12-26 | End: 2022-12-26 | Stop reason: SDUPTHER

## 2022-12-26 RX ORDER — OXYCODONE AND ACETAMINOPHEN 7.5; 325 MG/1; MG/1
1 TABLET ORAL EVERY 6 HOURS PRN
Refills: 0 | Status: CANCELLED | DISCHARGE
Start: 2022-12-26 | End: 2022-12-29

## 2022-12-26 RX ADMIN — ISOSORBIDE MONONITRATE 30 MG: 30 TABLET, EXTENDED RELEASE ORAL at 09:14

## 2022-12-26 RX ADMIN — Medication 1000 UNITS: at 09:14

## 2022-12-26 RX ADMIN — METOPROLOL SUCCINATE 25 MG: 25 TABLET, EXTENDED RELEASE ORAL at 09:14

## 2022-12-26 RX ADMIN — SODIUM CHLORIDE, PRESERVATIVE FREE 10 ML: 5 INJECTION INTRAVENOUS at 09:13

## 2022-12-26 RX ADMIN — LEVOTHYROXINE SODIUM 50 MCG: 50 TABLET ORAL at 06:26

## 2022-12-26 RX ADMIN — APIXABAN 2.5 MG: 2.5 TABLET, FILM COATED ORAL at 09:14

## 2022-12-26 RX ADMIN — Medication 1 TABLET: at 09:14

## 2022-12-26 NOTE — PLAN OF CARE
Problem: Discharge Planning  Goal: Discharge to home or other facility with appropriate resources  Outcome: Completed     Problem: Skin/Tissue Integrity  Goal: Absence of new skin breakdown  Description: 1. Monitor for areas of redness and/or skin breakdown  2. Assess vascular access sites hourly  3. Every 4-6 hours minimum:  Change oxygen saturation probe site  4. Every 4-6 hours:  If on nasal continuous positive airway pressure, respiratory therapy assess nares and determine need for appliance change or resting period. Outcome: Completed     Problem: ABCDS Injury Assessment  Goal: Absence of physical injury  12/26/2022 1248 by Anali Barragan RN  Outcome: Completed  12/26/2022 0229 by Yulissa Patterson RN  Outcome: Progressing     Problem: Safety - Adult  Goal: Free from fall injury  Outcome: Completed     Problem: Confusion  Goal: Confusion, delirium, dementia, or psychosis is improved or at baseline  Description: INTERVENTIONS:  1. Assess for possible contributors to thought disturbance, including medications, impaired vision or hearing, underlying metabolic abnormalities, dehydration, psychiatric diagnoses, and notify attending LIP  2. [place] high risk fall precautions, as indicated  3. Provide frequent short contacts to provide reality reorientation, refocusing and direction  4. Decrease environmental stimuli, including noise as appropriate  5. Monitor and intervene to maintain adequate nutrition, hydration, elimination, sleep and activity  6. If unable to ensure safety without constant attention obtain sitter and review sitter guidelines with assigned personnel  7. Initiate Psychosocial CNS and Spiritual Care consult, as indicated  Outcome: Completed     Problem: Safety - Medical Restraint  Goal: Remains free of injury from restraints (Restraint for Interference with Medical Device)  Description: INTERVENTIONS:  1.  Determine that other, less restrictive measures have been tried or would not be effective before applying the restraint  2. Evaluate the patient's condition at the time of restraint application  3. Inform patient/family regarding the reason for restraint  4.  Q2H: Monitor safety, psychosocial status, comfort, nutrition and hydration  Outcome: Completed     Problem: Nutrition Deficit:  Goal: Optimize nutritional status  Outcome: Completed     Problem: Neurosensory - Adult  Goal: Achieves stable or improved neurological status  Outcome: Completed  Goal: Absence of seizures  Outcome: Completed  Goal: Remains free of injury related to seizures activity  Outcome: Completed  Goal: Achieves maximal functionality and self care  Outcome: Completed     Problem: Cardiovascular - Adult  Goal: Maintains optimal cardiac output and hemodynamic stability  Outcome: Completed  Goal: Absence of cardiac dysrhythmias or at baseline  Outcome: Completed     Problem: Skin/Tissue Integrity - Adult  Goal: Skin integrity remains intact  Outcome: Completed  Goal: Incisions, wounds, or drain sites healing without S/S of infection  Outcome: Completed  Goal: Oral mucous membranes remain intact  Outcome: Completed     Problem: Musculoskeletal - Adult  Goal: Return mobility to safest level of function  Outcome: Completed  Goal: Maintain proper alignment of affected body part  Outcome: Completed  Goal: Return ADL status to a safe level of function  Outcome: Completed     Problem: Genitourinary - Adult  Goal: Absence of urinary retention  Outcome: Completed  Goal: Urinary catheter remains patent  Outcome: Completed     Problem: Pain  Goal: Verbalizes/displays adequate comfort level or baseline comfort level  Outcome: Completed

## 2022-12-26 NOTE — DISCHARGE SUMMARY
[facility] Hospitalists    Discharge Summary      Randy Hinkle  :  1932  MRN:  [id number]    Admit date:  2022  Discharge date:    2022    Discharging Physician:  Dr. Maximilian Zapata Directive: Full Code    Consults: orthopedic surgery    Primary Care Physician:  Ines Traore    Discharge Diagnoses:  Principal Problem:    Closed fracture of other parts of pelvis, initial encounter [facility])  Active Problems:    Fall    Syncope and collapse    COVID  Resolved Problems:    * No resolved hospital problems. *      Portions of this note have been copied forward, however, changed to reflect the most current clinical status of this patient. Hospital Course: The patient is a 80 y.o. female with PMH of HTN, hypothyroidism, AF who presented to [facility] ED with complaints of fall. Stated she became dizzy, lightheadedness and LOC on day of admission. Reported landing on her right hip and head injury. Denied loss of bowel or bladder. Stated she was unable to ambulate after fall. Received IV fentanyl and IV Ativan due to increased pain in ED. Upon arrival to floor rapid response was called due to oxygen saturation in the 60s. Enrique Anderson NP and Dr. Geena Vázquez present and 0.2 mg Narcan given, patient arousable to speech, and answering questions appropriately. Continuous pulse oximetry and monitor on telemetry on medical floor at this time. Work-up in ER CXR no acute cardiopulmonary process, CT lumbar spine no acute fracture or subluxation, CT head no acute intracranial abnormality, CT pelvis multiple fractures of right sacrum, right inferior pubic rami and left pubic symphysis, no subluxation or dislocation, soft tissue swelling/blood products to the angulated right inferior pubic rami fracture, right hip prosthetic appears intact. Patient was admitted to hospital medicine for pelvic fracture with orthopedic surgery consultation.  Orthopedic surgery recommended nonoperative treatment, pain control, DVT prophylaxis, weightbearing as tolerated with a walker. Case management was assisting with SNF placement. Patient was evaluated by speech, whom recommended dysphagia, soft and bite-size. Patient was ready for discharge to SNF on 12/22/2022, however tested positive for COVID. Patient asymptomatic from COVID standpoint. Did not require supplemental oxygen. Patient is being discharged on PRN pain medications for up to 3 days, and Colace. She has been advised to follow up with PCP and orthopedic surgery as recommended. Patient is currently in stable condition to be discharged to SNF. Significant Diagnostic Studies:     CT Head W/O Contrast  Result Date: 12/16/2022     1. No acute intracranial abnormality. 2. Mild chronic changes the brain parenchyma from small vessel ischemic. 3. Age-appropriate atrophic changes. Recommendation: Follow up as clinically indicated. All CT scans at this facility utilize dose modulation, iterative reconstruction, and/or weight based dosing when appropriate to reduce radiation dose to as low as reasonably achievable. Dictated and Electronically Signed by Hazel Toth MD, [facility] CERTIFIED at [id number] 05:55:38 PM               CT LUMBAR SPINE WO CONTRAST  Result Date: 12/16/2022    1. Multilevel lumbar spondylosis which is moderate to marked in severity. Multilevel mild to moderate neural foraminal stenosis. No severe bony canal stenosis. 2. No acute abnormality. 3. Retroperitoneal soft tissues are unremarkable. Recommendation: Follow up as clinically indicated. All CT scans at this facility utilize dose modulation, iterative reconstruction, and/or weight based dosing when appropriate to reduce radiation dose to as low as reasonably achievable. Dictated and Electronically Signed by Hazel Toth MD, [address] [place] [address] at [id number] 05:46:42 PM               CT PELVIS WO CONTRAST Additional Contrast? None  Result Date: 12/16/2022    1.  Multiple fractures of the right sacrum, right inferior pubic rami and left pubic symphysis. 2. No subluxation or dislocation. 3. Soft tissue swelling/blood products adjacent to the angulated right inferior pubic rami fracture. 4. Right hip prosthesis appears intact. Recommendation: Follow up as clinically indicated. All CT scans at this facility utilize dose modulation, iterative reconstruction, and/or weight based dosing when appropriate to reduce radiation dose to as low as reasonably achievable. Dictated and Electronically Signed by Lauren Mohan MD, [facility] CERTIFIED at [id number] 05:52:09 PM               XR CHEST PORTABLE  Result Date: 12/16/2022    No acute cardiopulmonary process identified. CTA PULMONARY W CONTRAST  Result Date: 12/17/2022    1. Atelectasis and pneumonia is at the lung bases with traction bronchiectasis. Paraseptal emphysema. Calcified granuloma right upper lobe. 2. Atherosclerosis of the aorta and branches. 3. Tiny stone left kidney without hydronephrosis. Recommendation: Follow up as clinically indicated. All CT scans at this facility utilize dose modulation, iterative reconstruction, and/or weight based dosing when appropriate to reduce radiation dose to as low as reasonably achievable. Dictated and Electronically Signed by Jesus Still MD at 17-Dec-2022 03:46:06 PM               XR HIP 2-3 VW W PELVIS RIGHT    Result Date: 12/16/2022  CLINICAL HISTORY: Fall, right hip pain TECHNIQUE: 4 views of the pelvis and right hip COMPARISON: None    The bones are diffusely demineralized. Question nondisplaced right inferior pubic ramus fracture and minimally displaced left superior pubic ramus fracture. Intramedullary sudhir is seen in the right femur. The hardware appears intact. A CT showed be performed for further characterization. Vascular calcifications.       Pertinent Labs:     BMP:    Recent Labs     12/24/22  1118 12/25/22  0336 12/25/22  0930 12/26/22  0206     --  143  --    K 3.9 5.0 4.1 4.8     --  103  --    CO2 32* --  33*  --    BUN 55*  --  47*  --    CREATININE 0.9  --  0.6  --    GLUCOSE 129*  --  126*  --          Physical Exam:   Vital Signs: BP (!) 143/86   Pulse 76   Temp 97.7 °F (36.5 °C) (Temporal)   Resp 20   Ht 5' 2\" (1.575 m)   Wt 100 lb 1 oz (45.4 kg)   SpO2 96%   BMI 18.30 kg/m²   General appearance:. Alert and Cooperative   HEENT: Normocephalic. Chest: Lung sounds clear bilaterally without wheezes or rhonchi. Cardiac: RRR, S1, S2 normal. No murmurs, gallops, or rubs auscultated. Abdomen: soft, non-tender; non-distended normal bowel sounds no masses, no organomegaly. Extremities: No clubbing or cyanosis. No peripheral edema. Peripheral pulses palpable. Neurologic: Grossly intact. Discharge Medications:          Medication List        START taking these medications      acetaminophen 500 MG tablet  Commonly known as: TYLENOL  Take 2 tablets by mouth every 6 hours as needed for Pain     docusate 100 MG Caps  Commonly known as: COLACE, DULCOLAX  Take 100 mg by mouth daily  Start taking on: December 27, 2022     naproxen 500 MG tablet  Commonly known as: NAPROSYN  Take 1 tablet by mouth 2 times daily as needed for Pain     oxyCODONE-acetaminophen 5-325 MG per tablet  Commonly known as: Percocet  Take 1 tablet by mouth every 6 hours as needed for Pain for up to 3 days. Intended supply: 3 days.  Take lowest dose possible to manage pain Max Daily Amount: 4 tablets            CONTINUE taking these medications      apixaban 2.5 MG Tabs tablet  Commonly known as: ELIQUIS     isosorbide mononitrate 30 MG extended release tablet  Commonly known as: IMDUR  Take 1 tablet by mouth daily     levothyroxine 50 MCG tablet  Commonly known as: SYNTHROID     lisinopril 40 MG tablet  Commonly known as: PRINIVIL;ZESTRIL     metoprolol succinate 25 MG extended release tablet  Commonly known as: TOPROL XL     mirtazapine 7.5 MG tablet  Commonly known as: REMERON     nitroGLYCERIN 0.4 MG SL tablet  Commonly known as: NITROSTAT  Place 1 tablet under the tongue every 5 minutes as needed for Chest pain up to max of 3 total doses. If no relief after 1 dose, call 911. vitamin D 25 MCG (1000 UT) Tabs tablet  Commonly known as: CHOLECALCIFEROL            STOP taking these medications      UNABLE TO FIND               Where to Get Your Medications        You can get these medications from any pharmacy    Bring a paper prescription for each of these medications  oxyCODONE-acetaminophen 5-325 MG per tablet       Information about where to get these medications is not yet available    Ask your nurse or doctor about these medications  acetaminophen 500 MG tablet  docusate 100 MG Caps  naproxen 500 MG tablet            Discharge Instructions: Follow up with Angela Mayes in 7 days. Follow up with orthopedic surgery as recommended. Take medications as directed. Resume activity as tolerated. Diet: ADULT ORAL NUTRITION SUPPLEMENT; Breakfast, Lunch, Dinner; Standard High Calorie/High Protein Oral Supplement  ADULT DIET; Dysphagia - Soft and Bite Sized; Safety Tray; Safety Tray (Disposables)     Disposition: Patient is medically stable and will be discharged to SNF. Time spent on discharge 38 minutes spent in assessing patient, reviewing medications, discussion with nursing, confirming safe discharge plan and preparation of discharge summary. Signed:  Electronically signed by YOVANA Grubbs CNP on 12/26/22 at 12:17 PM CST         EMR Dragon/Transcription disclaimer:   Much of this encounter note is an electronic transcription/translation of spoken language to printed text.  The electronic translation of spoken language may permit erroneous, or at times, nonsensical words or phrases to be inadvertently transcribed; although attempts have made to review the note for such errors, some may still exist.

## 2023-01-05 NOTE — PROGRESS NOTES
Physician Progress Note      Natalie Carter  [facility] #:                  [id number]  :                       1932  ADMIT DATE:       2022 12:59 PM  [address] [place] DATE:        2022 2:23 PM  RESPONDING  PROVIDER #:        Gene Zuñiga MD          QUERY TEXT:    Pt admitted with right pubis fracture. Pt. had a ground level fall. If   possible, please document in progress notes and discharge summary if you are   evaluating and/or treating any of the following: The medical record reflects the following:  Risk Factors: Osteoporosis  Clinical Indicators: XR of pelvis notes, \"bones are diffusely demineralized  Treatment: XRays, Pain medications, CTs, ortho consult,    Thank you in advance,    Rolanda Durbin RN-BSN, [facility]  Clinical   [email or web address]. Eliana [place]  Options provided:  -- Pathological right pubis fracture  -- Pathological right pubis fracture due to osteopenia  -- Pathological right pubis fracture due to osteopenia following fall which   would not usually break a normal, healthy bone  -- Other - I will add my own diagnosis  -- Disagree - Not applicable / Not valid  -- Disagree - Clinically unable to determine / Unknown  -- Refer to Clinical Documentation Reviewer    PROVIDER RESPONSE TEXT:    This patient has a pathological right pubis fracture due to osteopenia   following fall which would not usually break a normal, healthy bone.     Query created by: Arabella Lentz on 1/3/2023 11:55 AM      Electronically signed by:  Gene Zuñiga MD 2023 8:35 AM
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
## 2023-02-25 PROCEDURE — 93294 REM INTERROG EVL PM/LDLS PM: CPT | Performed by: INTERNAL MEDICINE

## 2023-02-25 PROCEDURE — 93296 REM INTERROG EVL PM/IDS: CPT | Performed by: INTERNAL MEDICINE

## 2023-02-28 DIAGNOSIS — Z95.0 PACEMAKER: Primary | ICD-10-CM

## 2023-02-28 DIAGNOSIS — I49.5 SICK SINUS SYNDROME DUE TO SA NODE DYSFUNCTION (HCC): ICD-10-CM

## 2023-04-05 DIAGNOSIS — R00.2 PALPITATION: ICD-10-CM

## 2023-04-05 DIAGNOSIS — Z95.0 PACEMAKER: Primary | ICD-10-CM

## 2023-04-05 DIAGNOSIS — R55 SYNCOPE AND COLLAPSE: ICD-10-CM

## 2023-05-31 DIAGNOSIS — Z95.0 PACEMAKER: Primary | ICD-10-CM

## 2023-05-31 DIAGNOSIS — R55 SYNCOPE AND COLLAPSE: ICD-10-CM

## 2023-05-31 PROCEDURE — 93296 REM INTERROG EVL PM/IDS: CPT | Performed by: CLINICAL NURSE SPECIALIST

## 2023-05-31 PROCEDURE — 93294 REM INTERROG EVL PM/LDLS PM: CPT | Performed by: CLINICAL NURSE SPECIALIST

## 2023-06-06 DIAGNOSIS — Z95.0 PACEMAKER: Primary | ICD-10-CM

## 2023-06-06 DIAGNOSIS — R00.2 PALPITATION: ICD-10-CM

## 2023-06-06 DIAGNOSIS — R55 SYNCOPE AND COLLAPSE: ICD-10-CM

## 2023-08-30 DIAGNOSIS — Z95.0 PACEMAKER: Primary | ICD-10-CM

## 2023-08-30 DIAGNOSIS — R55 SYNCOPE AND COLLAPSE: ICD-10-CM

## 2023-11-30 DIAGNOSIS — R55 SYNCOPE AND COLLAPSE: ICD-10-CM

## 2023-11-30 DIAGNOSIS — Z95.0 PACEMAKER: Primary | ICD-10-CM

## 2023-11-30 PROCEDURE — 93296 REM INTERROG EVL PM/IDS: CPT | Performed by: CLINICAL NURSE SPECIALIST

## 2023-11-30 PROCEDURE — 93294 REM INTERROG EVL PM/LDLS PM: CPT | Performed by: CLINICAL NURSE SPECIALIST

## 2024-03-04 PROCEDURE — 93294 REM INTERROG EVL PM/LDLS PM: CPT | Performed by: NURSE PRACTITIONER

## 2024-03-04 PROCEDURE — 93296 REM INTERROG EVL PM/IDS: CPT | Performed by: NURSE PRACTITIONER

## 2024-03-05 DIAGNOSIS — Z95.0 PACEMAKER: Primary | ICD-10-CM

## 2024-03-05 DIAGNOSIS — I49.5 SA NODE DYSFUNCTION (HCC): ICD-10-CM

## 2024-06-04 DIAGNOSIS — R55 SYNCOPE AND COLLAPSE: ICD-10-CM

## 2024-06-04 DIAGNOSIS — Z95.0 PACEMAKER: Primary | ICD-10-CM

## 2025-03-31 ENCOUNTER — RESULTS FOLLOW-UP (OUTPATIENT)
Dept: CARDIOLOGY CLINIC | Age: 89
End: 2025-03-31

## 2025-03-31 DIAGNOSIS — Z95.0 PACEMAKER: Primary | ICD-10-CM

## 2025-03-31 DIAGNOSIS — R55 SYNCOPE AND COLLAPSE: ICD-10-CM

## 2025-07-01 DIAGNOSIS — R55 SYNCOPE AND COLLAPSE: ICD-10-CM

## 2025-07-01 DIAGNOSIS — Z95.0 PACEMAKER: Primary | ICD-10-CM

## 2025-07-07 ENCOUNTER — RESULTS FOLLOW-UP (OUTPATIENT)
Dept: CARDIOLOGY CLINIC | Age: 89
End: 2025-07-07